# Patient Record
Sex: MALE | Race: WHITE | Employment: OTHER | URBAN - METROPOLITAN AREA
[De-identification: names, ages, dates, MRNs, and addresses within clinical notes are randomized per-mention and may not be internally consistent; named-entity substitution may affect disease eponyms.]

---

## 2017-01-24 PROBLEM — S62.112A: Status: ACTIVE | Noted: 2017-01-24

## 2018-06-08 ENCOUNTER — HOSPITAL ENCOUNTER (OUTPATIENT)
Dept: GENERAL RADIOLOGY | Age: 58
Discharge: HOME | End: 2018-06-10
Payer: MEDICARE

## 2018-06-08 DIAGNOSIS — Z13.820 SCREENING FOR OSTEOPOROSIS: ICD-10-CM

## 2018-06-08 PROCEDURE — 77080 DXA BONE DENSITY AXIAL: CPT

## 2019-03-29 ENCOUNTER — HOSPITAL ENCOUNTER (EMERGENCY)
Age: 59
Discharge: HOME | End: 2019-03-29
Attending: EMERGENCY MEDICINE
Payer: MEDICARE

## 2019-03-29 VITALS
TEMPERATURE: 97.5 F | HEART RATE: 78 BPM | BODY MASS INDEX: 26.52 KG/M2 | OXYGEN SATURATION: 96 % | DIASTOLIC BLOOD PRESSURE: 80 MMHG | SYSTOLIC BLOOD PRESSURE: 144 MMHG | RESPIRATION RATE: 16 BRPM | WEIGHT: 175 LBS | HEIGHT: 68 IN

## 2019-03-29 DIAGNOSIS — K21.00 GASTROESOPHAGEAL REFLUX DISEASE WITH ESOPHAGITIS: Primary | ICD-10-CM

## 2019-03-29 LAB
EKG ATRIAL RATE: 71 BPM
EKG P AXIS: 26 DEGREES
EKG P-R INTERVAL: 132 MS
EKG Q-T INTERVAL: 410 MS
EKG QRS DURATION: 90 MS
EKG QTC CALCULATION (BAZETT): 445 MS
EKG R AXIS: -23 DEGREES
EKG T AXIS: 1 DEGREES
EKG VENTRICULAR RATE: 71 BPM

## 2019-03-29 PROCEDURE — 93005 ELECTROCARDIOGRAM TRACING: CPT | Performed by: EMERGENCY MEDICINE

## 2019-03-29 PROCEDURE — 6370000000 HC RX 637 (ALT 250 FOR IP): Performed by: EMERGENCY MEDICINE

## 2019-03-29 PROCEDURE — 99284 EMERGENCY DEPT VISIT MOD MDM: CPT

## 2019-03-29 RX ORDER — SUCRALFATE 1 G/1
1 TABLET ORAL 4 TIMES DAILY
Qty: 40 TABLET | Refills: 1 | Status: SHIPPED | OUTPATIENT
Start: 2019-03-29

## 2019-03-29 RX ADMIN — LIDOCAINE HYDROCHLORIDE: 20 SOLUTION ORAL; TOPICAL at 08:07

## 2019-03-29 ASSESSMENT — PAIN DESCRIPTION - ORIENTATION: ORIENTATION: LEFT

## 2019-03-29 ASSESSMENT — PAIN DESCRIPTION - DESCRIPTORS: DESCRIPTORS: BURNING;CONSTANT

## 2019-03-29 ASSESSMENT — PAIN SCALES - GENERAL
PAINLEVEL_OUTOF10: 9
PAINLEVEL_OUTOF10: 3

## 2019-03-29 ASSESSMENT — PAIN DESCRIPTION - PAIN TYPE: TYPE: CHRONIC PAIN

## 2019-03-29 ASSESSMENT — PAIN DESCRIPTION - LOCATION: LOCATION: CHEST

## 2019-03-29 NOTE — ED PROVIDER NOTES
Interpretation: Normal      ---------------------------------------------------PHYSICAL EXAM--------------------------------------      Constitutional/General: Alert and oriented x3, well appearing, non toxic in NAD  Head: NC/AT  Eyes: PERRL, EOMI    Neck: Supple, full ROM, no meningeal signs  Pulmonary: Lungs clear to auscultation bilaterally, no wheezes, rales, or rhonchi. Not in respiratory distress  Cardiovascular:  Regular rate and rhythm, no murmurs, gallops, or rubs. 2+ distal pulses  Abdomen: Soft, non tender, non distended,   Extremities: Moves all extremities x 4. Warm and well perfused  Skin: warm and dry without rash  Neurologic: GCS 15,  Psych: Normal Affect      ------------------------------ ED COURSE/MEDICAL DECISION MAKING----------------------  Medications   aluminum & magnesium hydroxide-simethicone (MAALOX) 30 mL, lidocaine viscous (XYLOCAINE) 5 mL (GI COCKTAIL) ( Oral Given 3/29/19 0807)         Medical Decision Making:      EKG: This EKG is signed and interpreted by me. Rate: 71  Rhythm: Sinus  Interpretation: no acute changes  Comparison: stable as compared to patient's most recent EKG of 11-      Time: 8:20a  Re-evaluation. Patients symptoms are improving  Repeat physical examination is significantly improved  Carafate will be admitted to the patient's regimen  Counseling: The emergency provider has spoken with the patient and discussed todays results, in addition to providing specific details for the plan of care and counseling regarding the diagnosis and prognosis. Questions are answered at this time and they are agreeable with the plan.      --------------------------------- IMPRESSION AND DISPOSITION ---------------------------------    IMPRESSION  1.  Gastroesophageal reflux disease with esophagitis        DISPOSITION  Disposition: Discharge to home  Patient condition is stable                  Armida Rodgers MD  03/29/19 6149

## 2019-04-03 ENCOUNTER — HOSPITAL ENCOUNTER (OUTPATIENT)
Age: 59
Discharge: HOME | End: 2019-04-05
Payer: MEDICARE

## 2019-04-03 ENCOUNTER — HOSPITAL ENCOUNTER (OUTPATIENT)
Dept: GENERAL RADIOLOGY | Age: 59
Discharge: HOME | End: 2019-04-05
Payer: MEDICARE

## 2019-04-03 DIAGNOSIS — R07.9 CHEST PAIN, UNSPECIFIED TYPE: ICD-10-CM

## 2019-04-03 PROCEDURE — 71046 X-RAY EXAM CHEST 2 VIEWS: CPT

## 2019-04-23 ENCOUNTER — APPOINTMENT (OUTPATIENT)
Dept: GENERAL RADIOLOGY | Age: 59
End: 2019-04-23
Payer: MEDICARE

## 2019-04-23 ENCOUNTER — HOSPITAL ENCOUNTER (EMERGENCY)
Age: 59
Discharge: HOME | End: 2019-04-23
Attending: EMERGENCY MEDICINE
Payer: MEDICARE

## 2019-04-23 VITALS
BODY MASS INDEX: 28.79 KG/M2 | HEIGHT: 68 IN | SYSTOLIC BLOOD PRESSURE: 178 MMHG | RESPIRATION RATE: 14 BRPM | DIASTOLIC BLOOD PRESSURE: 80 MMHG | TEMPERATURE: 98.4 F | HEART RATE: 70 BPM | WEIGHT: 190 LBS | OXYGEN SATURATION: 97 %

## 2019-04-23 DIAGNOSIS — K21.9 GASTROESOPHAGEAL REFLUX DISEASE WITHOUT ESOPHAGITIS: Primary | ICD-10-CM

## 2019-04-23 DIAGNOSIS — R07.89 CHEST WALL PAIN: ICD-10-CM

## 2019-04-23 LAB
ALBUMIN SERPL-MCNC: 4.2 G/DL (ref 3.5–5.2)
ALP BLD-CCNC: 83 U/L (ref 40–129)
ALT SERPL-CCNC: 27 U/L (ref 0–40)
ANION GAP SERPL CALCULATED.3IONS-SCNC: 12 MMOL/L (ref 7–16)
AST SERPL-CCNC: 33 U/L (ref 0–39)
BASOPHILS ABSOLUTE: 0.04 E9/L (ref 0–0.2)
BASOPHILS RELATIVE PERCENT: 0.5 % (ref 0–2)
BILIRUB SERPL-MCNC: <0.2 MG/DL (ref 0–1.2)
BUN BLDV-MCNC: 15 MG/DL (ref 8–23)
CALCIUM SERPL-MCNC: 9 MG/DL (ref 8.6–10.2)
CHLORIDE BLD-SCNC: 102 MMOL/L (ref 98–107)
CO2: 24 MMOL/L (ref 22–29)
CREAT SERPL-MCNC: 0.8 MG/DL (ref 0.7–1.2)
EOSINOPHILS ABSOLUTE: 0.08 E9/L (ref 0.05–0.5)
EOSINOPHILS RELATIVE PERCENT: 0.9 % (ref 0–6)
GFR AFRICAN AMERICAN: >60
GFR NON-AFRICAN AMERICAN: >60 ML/MIN/1.73
GLUCOSE BLD-MCNC: 104 MG/DL (ref 74–99)
HCT VFR BLD CALC: 40.1 % (ref 37–54)
HEMOGLOBIN: 13.4 G/DL (ref 12.5–16.5)
IMMATURE GRANULOCYTES #: 0.04 E9/L
IMMATURE GRANULOCYTES %: 0.5 % (ref 0–5)
LIPASE: 57 U/L (ref 13–60)
LYMPHOCYTES ABSOLUTE: 2.01 E9/L (ref 1.5–4)
LYMPHOCYTES RELATIVE PERCENT: 22.8 % (ref 20–42)
MCH RBC QN AUTO: 30.2 PG (ref 26–35)
MCHC RBC AUTO-ENTMCNC: 33.4 % (ref 32–34.5)
MCV RBC AUTO: 90.5 FL (ref 80–99.9)
MONOCYTES ABSOLUTE: 1.47 E9/L (ref 0.1–0.95)
MONOCYTES RELATIVE PERCENT: 16.6 % (ref 2–12)
NEUTROPHILS ABSOLUTE: 5.19 E9/L (ref 1.8–7.3)
NEUTROPHILS RELATIVE PERCENT: 58.7 % (ref 43–80)
PDW BLD-RTO: 13.2 FL (ref 11.5–15)
PLATELET # BLD: 240 E9/L (ref 130–450)
PMV BLD AUTO: 10.4 FL (ref 7–12)
POTASSIUM SERPL-SCNC: 5.4 MMOL/L (ref 3.5–5)
RBC # BLD: 4.43 E12/L (ref 3.8–5.8)
SODIUM BLD-SCNC: 138 MMOL/L (ref 132–146)
TOTAL PROTEIN: 7.5 G/DL (ref 6.4–8.3)
TROPONIN: <0.01 NG/ML (ref 0–0.03)
WBC # BLD: 8.8 E9/L (ref 4.5–11.5)

## 2019-04-23 PROCEDURE — 36415 COLL VENOUS BLD VENIPUNCTURE: CPT

## 2019-04-23 PROCEDURE — 74022 RADEX COMPL AQT ABD SERIES: CPT

## 2019-04-23 PROCEDURE — 80053 COMPREHEN METABOLIC PANEL: CPT

## 2019-04-23 PROCEDURE — 99285 EMERGENCY DEPT VISIT HI MDM: CPT

## 2019-04-23 PROCEDURE — 96374 THER/PROPH/DIAG INJ IV PUSH: CPT

## 2019-04-23 PROCEDURE — 85025 COMPLETE CBC W/AUTO DIFF WBC: CPT

## 2019-04-23 PROCEDURE — 93005 ELECTROCARDIOGRAM TRACING: CPT | Performed by: EMERGENCY MEDICINE

## 2019-04-23 PROCEDURE — 6370000000 HC RX 637 (ALT 250 FOR IP): Performed by: EMERGENCY MEDICINE

## 2019-04-23 PROCEDURE — 2580000003 HC RX 258: Performed by: EMERGENCY MEDICINE

## 2019-04-23 PROCEDURE — 84484 ASSAY OF TROPONIN QUANT: CPT

## 2019-04-23 PROCEDURE — 83690 ASSAY OF LIPASE: CPT

## 2019-04-23 PROCEDURE — 2500000003 HC RX 250 WO HCPCS: Performed by: EMERGENCY MEDICINE

## 2019-04-23 RX ORDER — PANTOPRAZOLE SODIUM 40 MG/1
40 TABLET, DELAYED RELEASE ORAL DAILY
Qty: 20 TABLET | Refills: 1 | Status: SHIPPED | OUTPATIENT
Start: 2019-04-23

## 2019-04-23 RX ORDER — 0.9 % SODIUM CHLORIDE 0.9 %
500 INTRAVENOUS SOLUTION INTRAVENOUS ONCE
Status: COMPLETED | OUTPATIENT
Start: 2019-04-23 | End: 2019-04-23

## 2019-04-23 RX ADMIN — SODIUM CHLORIDE 500 ML: 9 INJECTION, SOLUTION INTRAVENOUS at 18:26

## 2019-04-23 RX ADMIN — FAMOTIDINE 20 MG: 10 INJECTION, SOLUTION INTRAVENOUS at 18:26

## 2019-04-23 RX ADMIN — LIDOCAINE HYDROCHLORIDE: 20 SOLUTION ORAL; TOPICAL at 18:26

## 2019-04-23 ASSESSMENT — PAIN DESCRIPTION - ORIENTATION: ORIENTATION: LEFT

## 2019-04-23 ASSESSMENT — PAIN - FUNCTIONAL ASSESSMENT: PAIN_FUNCTIONAL_ASSESSMENT: PREVENTS OR INTERFERES SOME ACTIVE ACTIVITIES AND ADLS

## 2019-04-23 ASSESSMENT — PAIN DESCRIPTION - DESCRIPTORS: DESCRIPTORS: BURNING

## 2019-04-23 ASSESSMENT — HEART SCORE: ECG: 1

## 2019-04-23 ASSESSMENT — PAIN DESCRIPTION - LOCATION: LOCATION: CHEST

## 2019-04-23 ASSESSMENT — PAIN DESCRIPTION - FREQUENCY: FREQUENCY: CONTINUOUS

## 2019-04-23 ASSESSMENT — PAIN DESCRIPTION - PAIN TYPE: TYPE: ACUTE PAIN

## 2019-04-23 ASSESSMENT — PAIN SCALES - GENERAL: PAINLEVEL_OUTOF10: 9

## 2019-04-23 NOTE — ED PROVIDER NOTES
HPI:  4/23/19, Time: 6:18 PM        Mackenzie Andrews is a 59 y.o. male presenting to the ED for \"acid reflux symptoms\"  beginning 2 weeks ago. The complaint has been intermittent, moderate in severity, and worsened by nothing. Patient states pain does occasionally radiate into the left pectoral area. He denies any sharp or stabbing character. No radiation to the neck or jaw or down the left arm. No associated nauseous/vomiting, no diaphoresis, no shows breath associated. He denies any worsening of symptoms with exertion. He states he stopped taking his Protonix approximately 2-3 days ago. He has not had any other symptoms. No relieving factors report no other complaint. Review of Systems:   Pertinent positives and negatives are stated within HPI, all other systems reviewed and are negative.    --------------------------------------------- PAST HISTORY ---------------------------------------------  Past Medical History:  has a past medical history of Epilepsy ([facility] [address] Essential hypertension, GERD (gastroesophageal reflux disease), Hyperlipidemia LDL goal <100, and Seizures ([facility] [address] Past Surgical History:  has a past surgical history that includes knee surgery; ECHO Compl W Dop Color Flow (2/8/2013); and fracture surgery. Social History:  reports that he has never smoked. He has never used smokeless tobacco. He reports that he does not drink alcohol or use drugs. Family History: family history includes Cancer in his maternal grandmother; Diabetes in his father. The patients home medications have been reviewed.     Allergies: Phenobarbital    -------------------------------------------------- RESULTS -------------------------------------------------  All laboratory and radiology results have been personally reviewed by myself   LABS:  Results for orders placed or performed during the hospital encounter of 04/23/19   Troponin   Result Value Ref Range    Troponin <0.01 0.00 - 0.03 ng/mL   CBC Auto Differential   Result Value Ref Range    WBC 8.8 4.5 - 11.5 E9/L    RBC 4.43 3.80 - 5.80 E12/L    Hemoglobin 13.4 12.5 - 16.5 g/dL    Hematocrit 40.1 37.0 - 54.0 %    MCV 90.5 80.0 - 99.9 fL    MCH 30.2 26.0 - 35.0 pg    MCHC 33.4 32.0 - 34.5 %    RDW 13.2 11.5 - 15.0 fL    Platelets 667 203 - 330 E9/L    MPV 10.4 7.0 - 12.0 fL    Neutrophils % 58.7 43.0 - 80.0 %    Immature Granulocytes % 0.5 0.0 - 5.0 %    Lymphocytes % 22.8 20.0 - 42.0 %    Monocytes % 16.6 (H) 2.0 - 12.0 %    Eosinophils % 0.9 0.0 - 6.0 %    Basophils % 0.5 0.0 - 2.0 %    Neutrophils # 5.19 1.80 - 7.30 E9/L    Immature Granulocytes # 0.04 E9/L    Lymphocytes # 2.01 1.50 - 4.00 E9/L    Monocytes # 1.47 (H) 0.10 - 0.95 E9/L    Eosinophils # 0.08 0.05 - 0.50 E9/L    Basophils # 0.04 0.00 - 0.20 E9/L   Comprehensive Metabolic Panel   Result Value Ref Range    Sodium 138 132 - 146 mmol/L    Potassium 5.4 (H) 3.5 - 5.0 mmol/L    Chloride 102 98 - 107 mmol/L    CO2 24 22 - 29 mmol/L    Anion Gap 12 7 - 16 mmol/L    Glucose 104 (H) 74 - 99 mg/dL    BUN 15 8 - 23 mg/dL    CREATININE 0.8 0.7 - 1.2 mg/dL    GFR Non-African American >60 >=60 mL/min/1.73    GFR African American >60     Calcium 9.0 8.6 - 10.2 mg/dL    Total Protein 7.5 6.4 - 8.3 g/dL    Alb 4.2 3.5 - 5.2 g/dL    Total Bilirubin <0.2 0.0 - 1.2 mg/dL    Alkaline Phosphatase 83 40 - 129 U/L    ALT 27 0 - 40 U/L    AST 33 0 - 39 U/L   Lipase   Result Value Ref Range    Lipase 57 13 - 60 U/L       RADIOLOGY:  Interpreted by Radiologist.  XR Acute Abd Series Chest 1 VW   Final Result   Nonobstructive bowel gas pattern.                ------------------------- NURSING NOTES AND VITALS REVIEWED ---------------------------  The nursing notes within the ED encounter and vital signs as below have been reviewed.    BP (!) 178/80   Pulse 70   Temp 98.4 °F (36.9 °C) (Oral)   Resp 14   Ht 5' 8\" (1.727 m)   Wt 190 lb (86.2 kg)   SpO2 97%   BMI 28.89 kg/m²   Oxygen Saturation Interpretation: Normal      ---------------------------------------------------PHYSICAL EXAM--------------------------------------      Constitutional/General: Alert and oriented x3, well appearing, non toxic in mild distress  Head: Normocephalic and atraumatic  Eyes: PERRL, EOMI, otherwise normal  Mouth: Oropharynx clear, handling secretions, no trismus  Neck: Supple, full ROM, trachea midline. No JVD  Pulmonary: Lungs clear to auscultation bilaterally, no wheezes, rales, or rhonchi. Not in respiratory distress  Cardiovascular:  Regular rate and rhythm, no murmurs, gallops, or rubs. 2+ distal pulses;  PMI nondisplaced  Abdomen: Soft, non tender, non distended, no organomegaly no masses no guarding or rigidity. Normal bowel sounds  Extremities: Moves all extremities x 4. Warm and well perfused  Skin: warm and dry without rash  Neurologic: GCS 15, cranial nerves II through XII are grossly intact no focal deficits. Psych: Normal Affect    ------------------------------ ED COURSE/MEDICAL DECISION MAKING----------------------  Medications   0.9 % sodium chloride bolus (500 mLs Intravenous New Bag 4/23/19 1826)   famotidine (PEPCID) injection 20 mg (20 mg Intravenous Given 4/23/19 1826)   aluminum & magnesium hydroxide-simethicone (MAALOX) 30 mL, lidocaine viscous (XYLOCAINE) 5 mL (GI COCKTAIL) ( Oral Given 4/23/19 1826)     ED COURSE:     Medical Decision Making:   Differential Diagnoses:  GERD, MI, PE, Pneumonia, Pneumothorax, Chest Wall Strain, Pericarditis, to name a few. Pt's HEART Score = 4 points, Moderate Risk Score;  for this reason a troponin was obtained even though his symptoms sounded more like GERD. Patient has had the symptoms for 2 weeks and has a normal troponin so this is not felt to be unlikely anginal equivalent discomfort.   Pt's Well's Score for PE = 0 points, Low Risk Score  Pt's CXR does not show any focal infiltrates nor any evidence of pneumothorax nor any mediastinal abnormalities to suggest aortic

## 2019-04-26 LAB
EKG ATRIAL RATE: 77 BPM
EKG P AXIS: 38 DEGREES
EKG P-R INTERVAL: 140 MS
EKG Q-T INTERVAL: 384 MS
EKG QRS DURATION: 86 MS
EKG QTC CALCULATION (BAZETT): 434 MS
EKG R AXIS: -22 DEGREES
EKG T AXIS: 21 DEGREES
EKG VENTRICULAR RATE: 77 BPM

## 2019-06-26 ENCOUNTER — HOSPITAL ENCOUNTER (OUTPATIENT)
Dept: GENERAL RADIOLOGY | Age: 59
Discharge: HOME | End: 2019-06-28
Payer: MEDICARE

## 2019-06-26 ENCOUNTER — HOSPITAL ENCOUNTER (OUTPATIENT)
Age: 59
Discharge: HOME | End: 2019-06-28
Payer: MEDICARE

## 2019-06-26 DIAGNOSIS — R07.9 CHEST PAIN, UNSPECIFIED TYPE: ICD-10-CM

## 2019-06-26 PROCEDURE — 71046 X-RAY EXAM CHEST 2 VIEWS: CPT

## 2019-07-26 ENCOUNTER — APPOINTMENT (OUTPATIENT)
Dept: GENERAL RADIOLOGY | Age: 59
End: 2019-07-26
Payer: MEDICARE

## 2019-07-26 ENCOUNTER — HOSPITAL ENCOUNTER (EMERGENCY)
Age: 59
Discharge: HOME | End: 2019-07-26
Attending: EMERGENCY MEDICINE
Payer: MEDICARE

## 2019-07-26 VITALS
SYSTOLIC BLOOD PRESSURE: 132 MMHG | OXYGEN SATURATION: 97 % | TEMPERATURE: 98.8 F | HEIGHT: 68 IN | BODY MASS INDEX: 26.83 KG/M2 | RESPIRATION RATE: 16 BRPM | WEIGHT: 177 LBS | HEART RATE: 90 BPM | DIASTOLIC BLOOD PRESSURE: 84 MMHG

## 2019-07-26 DIAGNOSIS — K21.9 GASTROESOPHAGEAL REFLUX DISEASE WITHOUT ESOPHAGITIS: Primary | ICD-10-CM

## 2019-07-26 LAB
EKG ATRIAL RATE: 87 BPM
EKG P AXIS: 41 DEGREES
EKG P-R INTERVAL: 146 MS
EKG Q-T INTERVAL: 376 MS
EKG QRS DURATION: 92 MS
EKG QTC CALCULATION (BAZETT): 452 MS
EKG R AXIS: -21 DEGREES
EKG T AXIS: 44 DEGREES
EKG VENTRICULAR RATE: 87 BPM
TROPONIN: <0.01 NG/ML (ref 0–0.03)

## 2019-07-26 PROCEDURE — 71045 X-RAY EXAM CHEST 1 VIEW: CPT

## 2019-07-26 PROCEDURE — 93005 ELECTROCARDIOGRAM TRACING: CPT | Performed by: EMERGENCY MEDICINE

## 2019-07-26 PROCEDURE — 84484 ASSAY OF TROPONIN QUANT: CPT

## 2019-07-26 PROCEDURE — 36415 COLL VENOUS BLD VENIPUNCTURE: CPT

## 2019-07-26 PROCEDURE — 6370000000 HC RX 637 (ALT 250 FOR IP): Performed by: EMERGENCY MEDICINE

## 2019-07-26 PROCEDURE — 93010 ELECTROCARDIOGRAM REPORT: CPT | Performed by: INTERNAL MEDICINE

## 2019-07-26 PROCEDURE — 99284 EMERGENCY DEPT VISIT MOD MDM: CPT

## 2019-07-26 RX ORDER — FAMOTIDINE 20 MG/1
20 TABLET, FILM COATED ORAL 2 TIMES DAILY
Qty: 14 TABLET | Refills: 0 | Status: ON HOLD | OUTPATIENT
Start: 2019-07-26 | End: 2020-01-03 | Stop reason: HOSPADM

## 2019-07-26 RX ADMIN — LIDOCAINE HYDROCHLORIDE: 20 SOLUTION ORAL; TOPICAL at 02:19

## 2019-07-26 ASSESSMENT — PAIN DESCRIPTION - LOCATION: LOCATION: CHEST

## 2019-07-26 ASSESSMENT — PAIN DESCRIPTION - DESCRIPTORS: DESCRIPTORS: BURNING

## 2019-07-26 ASSESSMENT — HEART SCORE: ECG: 1

## 2019-07-26 ASSESSMENT — PAIN SCALES - GENERAL: PAINLEVEL_OUTOF10: 9

## 2019-07-26 ASSESSMENT — PAIN DESCRIPTION - ORIENTATION: ORIENTATION: LEFT

## 2019-07-26 ASSESSMENT — PAIN DESCRIPTION - PAIN TYPE: TYPE: ACUTE PAIN

## 2019-07-26 NOTE — ED NOTES
Left chest hurts and into axilla. Worsens at night and sometimes vomits. Pt does sleep with HOB elevated. Pt ate pizza last tez.      Ed Nicholson RN  07/26/19 3567

## 2019-07-30 ENCOUNTER — TELEPHONE (OUTPATIENT)
Dept: CARDIOLOGY | Age: 59
End: 2019-07-30

## 2019-08-05 ENCOUNTER — HOSPITAL ENCOUNTER (OUTPATIENT)
Age: 59
Discharge: HOME | End: 2019-08-07

## 2019-08-05 ENCOUNTER — TELEPHONE (OUTPATIENT)
Dept: CARDIOLOGY | Age: 59
End: 2019-08-05

## 2019-08-05 LAB
ABO/RH: NORMAL
ANION GAP SERPL CALCULATED.3IONS-SCNC: 14 MMOL/L (ref 7–16)
ANTIBODY SCREEN: NORMAL
BUN BLDV-MCNC: 26 MG/DL (ref 8–23)
CALCIUM SERPL-MCNC: 9.8 MG/DL (ref 8.6–10.2)
CHLORIDE BLD-SCNC: 93 MMOL/L (ref 98–107)
CO2: 29 MMOL/L (ref 22–29)
CREAT SERPL-MCNC: 1 MG/DL (ref 0.7–1.2)
GFR AFRICAN AMERICAN: >60
GFR NON-AFRICAN AMERICAN: >60 ML/MIN/1.73
GLUCOSE BLD-MCNC: 156 MG/DL (ref 74–99)
HCT VFR BLD CALC: 45.3 % (ref 37–54)
HEMOGLOBIN: 15.2 G/DL (ref 12.5–16.5)
MCH RBC QN AUTO: 30.5 PG (ref 26–35)
MCHC RBC AUTO-ENTMCNC: 33.6 % (ref 32–34.5)
MCV RBC AUTO: 91 FL (ref 80–99.9)
PDW BLD-RTO: 13.8 FL (ref 11.5–15)
PLATELET # BLD: 255 E9/L (ref 130–450)
PMV BLD AUTO: 11 FL (ref 7–12)
POTASSIUM SERPL-SCNC: 3.7 MMOL/L (ref 3.5–5)
RBC # BLD: 4.98 E12/L (ref 3.8–5.8)
SODIUM BLD-SCNC: 136 MMOL/L (ref 132–146)
WBC # BLD: 7.4 E9/L (ref 4.5–11.5)

## 2019-08-05 PROCEDURE — 87081 CULTURE SCREEN ONLY: CPT

## 2019-08-05 PROCEDURE — 86900 BLOOD TYPING SEROLOGIC ABO: CPT

## 2019-08-05 PROCEDURE — 80048 BASIC METABOLIC PNL TOTAL CA: CPT

## 2019-08-05 PROCEDURE — 85027 COMPLETE CBC AUTOMATED: CPT

## 2019-08-05 PROCEDURE — 86850 RBC ANTIBODY SCREEN: CPT

## 2019-08-05 PROCEDURE — 86901 BLOOD TYPING SEROLOGIC RH(D): CPT

## 2019-08-07 ENCOUNTER — HOSPITAL ENCOUNTER (OUTPATIENT)
Dept: CARDIOLOGY | Age: 59
Discharge: HOME | End: 2019-08-07
Payer: MEDICARE

## 2019-08-07 VITALS
HEART RATE: 86 BPM | SYSTOLIC BLOOD PRESSURE: 150 MMHG | HEIGHT: 68 IN | BODY MASS INDEX: 26.83 KG/M2 | DIASTOLIC BLOOD PRESSURE: 70 MMHG | WEIGHT: 177 LBS

## 2019-08-07 DIAGNOSIS — I20.8 ANGINAL EQUIVALENT (HCC): Primary | ICD-10-CM

## 2019-08-07 LAB
LV EF: 60 %
LVEF MODALITY: NORMAL
MRSA CULTURE ONLY: NORMAL

## 2019-08-07 PROCEDURE — 2580000003 HC RX 258: Performed by: INTERNAL MEDICINE

## 2019-08-07 PROCEDURE — 3430000000 HC RX DIAGNOSTIC RADIOPHARMACEUTICAL: Performed by: INTERNAL MEDICINE

## 2019-08-07 PROCEDURE — A9500 TC99M SESTAMIBI: HCPCS | Performed by: INTERNAL MEDICINE

## 2019-08-07 PROCEDURE — 93017 CV STRESS TEST TRACING ONLY: CPT

## 2019-08-07 PROCEDURE — 78452 HT MUSCLE IMAGE SPECT MULT: CPT

## 2019-08-07 RX ORDER — SODIUM CHLORIDE 0.9 % (FLUSH) 0.9 %
10 SYRINGE (ML) INJECTION PRN
Status: DISCONTINUED | OUTPATIENT
Start: 2019-08-07 | End: 2019-08-08 | Stop reason: HOSPADM

## 2019-08-07 RX ADMIN — Medication 10.2 MILLICURIE: at 08:22

## 2019-08-07 RX ADMIN — Medication 10 ML: at 08:22

## 2019-08-07 RX ADMIN — Medication 10 ML: at 10:44

## 2019-08-07 RX ADMIN — Medication 30.2 MILLICURIE: at 10:44

## 2019-08-17 ENCOUNTER — HOSPITAL ENCOUNTER (OUTPATIENT)
Age: 59
Discharge: HOME | End: 2019-08-19

## 2019-08-17 LAB
ALBUMIN SERPL-MCNC: 3.4 G/DL (ref 3.5–5.2)
ALP BLD-CCNC: 70 U/L (ref 40–129)
ALT SERPL-CCNC: 69 U/L (ref 0–40)
ANION GAP SERPL CALCULATED.3IONS-SCNC: 7 MMOL/L (ref 7–16)
AST SERPL-CCNC: 59 U/L (ref 0–39)
BASOPHILS ABSOLUTE: 0.03 E9/L (ref 0–0.2)
BASOPHILS RELATIVE PERCENT: 0.3 % (ref 0–2)
BILIRUB SERPL-MCNC: <0.2 MG/DL (ref 0–1.2)
BUN BLDV-MCNC: 9 MG/DL (ref 8–23)
CALCIUM SERPL-MCNC: 8.6 MG/DL (ref 8.6–10.2)
CHLORIDE BLD-SCNC: 102 MMOL/L (ref 98–107)
CO2: 28 MMOL/L (ref 22–29)
CREAT SERPL-MCNC: 0.7 MG/DL (ref 0.7–1.2)
EOSINOPHILS ABSOLUTE: 0.02 E9/L (ref 0.05–0.5)
EOSINOPHILS RELATIVE PERCENT: 0.2 % (ref 0–6)
GFR AFRICAN AMERICAN: >60
GFR NON-AFRICAN AMERICAN: >60 ML/MIN/1.73
GLUCOSE BLD-MCNC: 116 MG/DL (ref 74–99)
HCT VFR BLD CALC: 37 % (ref 37–54)
HEMOGLOBIN: 11.8 G/DL (ref 12.5–16.5)
IMMATURE GRANULOCYTES #: 0.03 E9/L
IMMATURE GRANULOCYTES %: 0.3 % (ref 0–5)
LYMPHOCYTES ABSOLUTE: 2.08 E9/L (ref 1.5–4)
LYMPHOCYTES RELATIVE PERCENT: 19.4 % (ref 20–42)
MCH RBC QN AUTO: 30.3 PG (ref 26–35)
MCHC RBC AUTO-ENTMCNC: 31.9 % (ref 32–34.5)
MCV RBC AUTO: 94.9 FL (ref 80–99.9)
MONOCYTES ABSOLUTE: 1.24 E9/L (ref 0.1–0.95)
MONOCYTES RELATIVE PERCENT: 11.6 % (ref 2–12)
NEUTROPHILS ABSOLUTE: 7.33 E9/L (ref 1.8–7.3)
NEUTROPHILS RELATIVE PERCENT: 68.2 % (ref 43–80)
PDW BLD-RTO: 13.9 FL (ref 11.5–15)
PLATELET # BLD: 211 E9/L (ref 130–450)
PMV BLD AUTO: 10.9 FL (ref 7–12)
POTASSIUM SERPL-SCNC: 4.5 MMOL/L (ref 3.5–5)
RBC # BLD: 3.9 E12/L (ref 3.8–5.8)
SODIUM BLD-SCNC: 137 MMOL/L (ref 132–146)
TOTAL PROTEIN: 6.6 G/DL (ref 6.4–8.3)
WBC # BLD: 10.7 E9/L (ref 4.5–11.5)

## 2019-08-17 PROCEDURE — 80053 COMPREHEN METABOLIC PANEL: CPT

## 2019-08-17 PROCEDURE — 85025 COMPLETE CBC W/AUTO DIFF WBC: CPT

## 2019-12-30 ENCOUNTER — HOSPITAL ENCOUNTER (INPATIENT)
Age: 59
LOS: 4 days | Discharge: HOME | DRG: 885 | End: 2020-01-03
Attending: EMERGENCY MEDICINE | Admitting: PSYCHIATRY & NEUROLOGY
Payer: MEDICARE

## 2019-12-30 PROBLEM — F32.A ACUTE DEPRESSION: Status: ACTIVE | Noted: 2019-12-30

## 2019-12-30 LAB
ACETAMINOPHEN LEVEL: <5 MCG/ML (ref 10–30)
ALBUMIN SERPL-MCNC: 4.2 G/DL (ref 3.5–5.2)
ALP BLD-CCNC: 87 U/L (ref 40–129)
ALT SERPL-CCNC: 18 U/L (ref 0–40)
AMPHETAMINE SCREEN, URINE: NOT DETECTED
ANION GAP SERPL CALCULATED.3IONS-SCNC: 11 MMOL/L (ref 7–16)
AST SERPL-CCNC: 15 U/L (ref 0–39)
BARBITURATE SCREEN URINE: NOT DETECTED
BASOPHILS ABSOLUTE: 0.04 E9/L (ref 0–0.2)
BASOPHILS RELATIVE PERCENT: 0.5 % (ref 0–2)
BENZODIAZEPINE SCREEN, URINE: NOT DETECTED
BILIRUB SERPL-MCNC: <0.2 MG/DL (ref 0–1.2)
BILIRUBIN URINE: NEGATIVE
BLOOD, URINE: NEGATIVE
BUN BLDV-MCNC: 19 MG/DL (ref 8–23)
CALCIUM SERPL-MCNC: 9.7 MG/DL (ref 8.6–10.2)
CANNABINOID SCREEN URINE: NOT DETECTED
CHLORIDE BLD-SCNC: 102 MMOL/L (ref 98–107)
CLARITY: CLEAR
CO2: 26 MMOL/L (ref 22–29)
COCAINE METABOLITE SCREEN URINE: NOT DETECTED
COLOR: YELLOW
CREAT SERPL-MCNC: 0.7 MG/DL (ref 0.7–1.2)
EOSINOPHILS ABSOLUTE: 0.08 E9/L (ref 0.05–0.5)
EOSINOPHILS RELATIVE PERCENT: 1 % (ref 0–6)
ETHANOL: <10 MG/DL (ref 0–0.08)
FENTANYL SCREEN, URINE: NOT DETECTED
GFR AFRICAN AMERICAN: >60
GFR NON-AFRICAN AMERICAN: >60 ML/MIN/1.73
GLUCOSE BLD-MCNC: 99 MG/DL (ref 74–99)
GLUCOSE URINE: NEGATIVE MG/DL
HCT VFR BLD CALC: 43.4 % (ref 37–54)
HEMOGLOBIN: 14.4 G/DL (ref 12.5–16.5)
IMMATURE GRANULOCYTES #: 0.03 E9/L
IMMATURE GRANULOCYTES %: 0.4 % (ref 0–5)
KETONES, URINE: NEGATIVE MG/DL
LEUKOCYTE ESTERASE, URINE: NEGATIVE
LYMPHOCYTES ABSOLUTE: 1.66 E9/L (ref 1.5–4)
LYMPHOCYTES RELATIVE PERCENT: 20.4 % (ref 20–42)
Lab: NORMAL
MCH RBC QN AUTO: 30.3 PG (ref 26–35)
MCHC RBC AUTO-ENTMCNC: 33.2 % (ref 32–34.5)
MCV RBC AUTO: 91.2 FL (ref 80–99.9)
METHADONE SCREEN, URINE: NOT DETECTED
MONOCYTES ABSOLUTE: 1.15 E9/L (ref 0.1–0.95)
MONOCYTES RELATIVE PERCENT: 14.1 % (ref 2–12)
NEUTROPHILS ABSOLUTE: 5.19 E9/L (ref 1.8–7.3)
NEUTROPHILS RELATIVE PERCENT: 63.6 % (ref 43–80)
NITRITE, URINE: NEGATIVE
OPIATE SCREEN URINE: NOT DETECTED
OXYCODONE URINE: NOT DETECTED
PDW BLD-RTO: 13.1 FL (ref 11.5–15)
PH UA: 6 (ref 5–9)
PHENCYCLIDINE SCREEN URINE: NOT DETECTED
PLATELET # BLD: 223 E9/L (ref 130–450)
PMV BLD AUTO: 10.5 FL (ref 7–12)
POTASSIUM SERPL-SCNC: 4.2 MMOL/L (ref 3.5–5)
PROTEIN UA: NEGATIVE MG/DL
RBC # BLD: 4.76 E12/L (ref 3.8–5.8)
SALICYLATE, SERUM: <0.3 MG/DL (ref 0–30)
SODIUM BLD-SCNC: 139 MMOL/L (ref 132–146)
SPECIFIC GRAVITY UA: 1.02 (ref 1–1.03)
TOTAL PROTEIN: 8.3 G/DL (ref 6.4–8.3)
TRICYCLIC ANTIDEPRESSANTS SCREEN SERUM: NEGATIVE NG/ML
TROPONIN: <0.01 NG/ML (ref 0–0.03)
UROBILINOGEN, URINE: 0.2 E.U./DL
WBC # BLD: 8.2 E9/L (ref 4.5–11.5)

## 2019-12-30 PROCEDURE — 36415 COLL VENOUS BLD VENIPUNCTURE: CPT

## 2019-12-30 PROCEDURE — 80307 DRUG TEST PRSMV CHEM ANLYZR: CPT

## 2019-12-30 PROCEDURE — 1240000000 HC EMOTIONAL WELLNESS R&B

## 2019-12-30 PROCEDURE — 85025 COMPLETE CBC W/AUTO DIFF WBC: CPT

## 2019-12-30 PROCEDURE — 93005 ELECTROCARDIOGRAM TRACING: CPT | Performed by: EMERGENCY MEDICINE

## 2019-12-30 PROCEDURE — 6370000000 HC RX 637 (ALT 250 FOR IP): Performed by: EMERGENCY MEDICINE

## 2019-12-30 PROCEDURE — 6370000000 HC RX 637 (ALT 250 FOR IP): Performed by: PSYCHIATRY & NEUROLOGY

## 2019-12-30 PROCEDURE — 80053 COMPREHEN METABOLIC PANEL: CPT

## 2019-12-30 PROCEDURE — 84484 ASSAY OF TROPONIN QUANT: CPT

## 2019-12-30 PROCEDURE — 81003 URINALYSIS AUTO W/O SCOPE: CPT

## 2019-12-30 PROCEDURE — 99291 CRITICAL CARE FIRST HOUR: CPT

## 2019-12-30 PROCEDURE — G0480 DRUG TEST DEF 1-7 CLASSES: HCPCS

## 2019-12-30 RX ORDER — PHENYTOIN SODIUM 100 MG/1
100 CAPSULE, EXTENDED RELEASE ORAL NIGHTLY
Status: DISCONTINUED | OUTPATIENT
Start: 2019-12-30 | End: 2019-12-30 | Stop reason: SDUPTHER

## 2019-12-30 RX ORDER — ACETAMINOPHEN 325 MG/1
650 TABLET ORAL EVERY 4 HOURS PRN
Status: DISCONTINUED | OUTPATIENT
Start: 2019-12-30 | End: 2020-01-03 | Stop reason: HOSPADM

## 2019-12-30 RX ORDER — OLANZAPINE 10 MG/1
5 INJECTION, POWDER, LYOPHILIZED, FOR SOLUTION INTRAMUSCULAR EVERY 4 HOURS PRN
Status: DISCONTINUED | OUTPATIENT
Start: 2019-12-30 | End: 2020-01-03 | Stop reason: HOSPADM

## 2019-12-30 RX ORDER — HYDROXYZINE PAMOATE 25 MG/1
50 CAPSULE ORAL 3 TIMES DAILY PRN
Status: DISCONTINUED | OUTPATIENT
Start: 2019-12-30 | End: 2020-01-03 | Stop reason: HOSPADM

## 2019-12-30 RX ORDER — LEVETIRACETAM 500 MG/1
1000 TABLET ORAL 2 TIMES DAILY
Status: DISCONTINUED | OUTPATIENT
Start: 2019-12-30 | End: 2019-12-31 | Stop reason: SDUPTHER

## 2019-12-30 RX ORDER — OLANZAPINE 2.5 MG/1
2.5 TABLET ORAL EVERY 4 HOURS PRN
Status: DISCONTINUED | OUTPATIENT
Start: 2019-12-30 | End: 2020-01-03 | Stop reason: HOSPADM

## 2019-12-30 RX ORDER — BENZTROPINE MESYLATE 1 MG/ML
2 INJECTION INTRAMUSCULAR; INTRAVENOUS 2 TIMES DAILY PRN
Status: DISCONTINUED | OUTPATIENT
Start: 2019-12-30 | End: 2020-01-03 | Stop reason: HOSPADM

## 2019-12-30 RX ORDER — PHENYTOIN SODIUM 100 MG/1
200 CAPSULE, EXTENDED RELEASE ORAL NIGHTLY
Status: DISCONTINUED | OUTPATIENT
Start: 2019-12-30 | End: 2020-01-03 | Stop reason: HOSPADM

## 2019-12-30 RX ORDER — MAGNESIUM HYDROXIDE/ALUMINUM HYDROXICE/SIMETHICONE 120; 1200; 1200 MG/30ML; MG/30ML; MG/30ML
30 SUSPENSION ORAL PRN
Status: DISCONTINUED | OUTPATIENT
Start: 2019-12-30 | End: 2020-01-03 | Stop reason: HOSPADM

## 2019-12-30 RX ORDER — LEVETIRACETAM 10 MG/ML
1000 INJECTION INTRAVASCULAR ONCE
Status: DISCONTINUED | OUTPATIENT
Start: 2019-12-30 | End: 2019-12-30

## 2019-12-30 RX ORDER — PHENYTOIN SODIUM 100 MG/1
30 CAPSULE, EXTENDED RELEASE ORAL ONCE
Status: DISCONTINUED | OUTPATIENT
Start: 2019-12-30 | End: 2019-12-30

## 2019-12-30 RX ADMIN — PHENYTOIN SODIUM 200 MG: 100 CAPSULE, EXTENDED RELEASE ORAL at 22:49

## 2019-12-30 RX ADMIN — LEVETIRACETAM 1000 MG: 500 TABLET ORAL at 19:43

## 2019-12-30 ASSESSMENT — PAIN SCALES - GENERAL: PAINLEVEL_OUTOF10: 0

## 2019-12-30 ASSESSMENT — PAIN - FUNCTIONAL ASSESSMENT
PAIN_FUNCTIONAL_ASSESSMENT: ACTIVITIES ARE NOT PREVENTED
PAIN_FUNCTIONAL_ASSESSMENT: 0-10

## 2019-12-30 ASSESSMENT — PAIN DESCRIPTION - DESCRIPTORS: DESCRIPTORS: BURNING

## 2019-12-30 ASSESSMENT — LIFESTYLE VARIABLES: HISTORY_ALCOHOL_USE: NO

## 2019-12-30 ASSESSMENT — SLEEP AND FATIGUE QUESTIONNAIRES
DO YOU USE A SLEEP AID: NO
AVERAGE NUMBER OF SLEEP HOURS: 8
DO YOU HAVE DIFFICULTY SLEEPING: NO

## 2019-12-30 ASSESSMENT — PATIENT HEALTH QUESTIONNAIRE - PHQ9: SUM OF ALL RESPONSES TO PHQ QUESTIONS 1-9: 3

## 2019-12-30 NOTE — ED NOTES
THE PT IS A 59 YR OLD WHITE MALE WHO WAS PINK SLIPPED BY POLICE AFTER THEY WERE CALLED TO THE HOUSE BY THE PT'S BROTHER. THE PINK SLIP STATED THAT THE PT SAID HE WAS GOING TO COMMIT SUICIDE ON DEC 31ST B/ THAT WAS THE ANNIVERSARY OF THE PT'S MOTHER'S DEATH. THE PT DENIES THIS AND STATES THAT HIS MOM  AUG 2ND 2017. THE PT STATED THAT HE SAID THINGS TO HIS BROTHER THAT MADE HIM THINK HE WAS SUICIDAL BUT HE DENIES THAT HE IS. THE PT ADMITTED THAT HE FELT SUICIDAL AROUND  BUT DENIES IT CURRENTLY. HE STATED THAT HE HAS BEEN DEPRESSED SINCE HIS MOM . HE STATED THAT HE LIVED WITH HIS MOM AND NEVER  OR HAD CHILDREN. HE STATED THAT HE USED TO WORK AT [employer] BUT IS NOW ON DISABILITY. HE STATED THAT HE STRUGGLES WITH EVERYDAY BILLS. HE STATED THAT HE WAS SEEING A COUNSELOR FOR 3 YRS AT [facility] BUT COULD NOT SEE HER ANYMORE WHEN HE GOT MEDICARE AND HE DID NOT WISH TO SWITCH COUNSELORS. HE STATED THAT HE HAS NEVER BEEN ON PSYCH MEDS AND HAS NEVER BEEN IN INPT PSYCH. HE DENIES PAST OR PRESENT HI, AVH, AND AOD USE. HE DENIES A HX OF SUICIDE ATTEMPTS. HE DID ADMIT THAT HE WAS FEELING VERY DEPRESSED ON DEC 26TH AND HE DROVE TO [place] WHICH IS WHAT HE DOES WHEN HE GETS DOWN. HE STATED THAT IT HELPED HIM. THE PT WAS CALM AND COOPERATIVE AND GAVE A GOOD HX EXCEPT WITH REGARD TO WHAT HE SAID TO HIS FAMILY TO MAKE THEM FEEL THAT HE WAS A RISK TO HIMSELF. THE PT WAS ACCEPTED BY DR Eve Ortega TO [facility].        Darcie Crockett, [facility]  19

## 2019-12-30 NOTE — ED PROVIDER NOTES
HPI:  12/30/19, Time: 3:06 PM         Delia Pang is a 62 y.o. male presenting to the ED for SI with no plan , beginning several hours ago. The complaint has been persistent, moderate in severity, and worsened by nothing. Patient reports ongoing depression since his wife passed away the same time last year. He told EMS he is suicidal has no plan. ROS:   Pertinent positives and negatives are stated within HPI, all other systems reviewed and are negative.  --------------------------------------------- PAST HISTORY ---------------------------------------------  Past Medical History:  has a past medical history of Epilepsy ([facility] [address] Essential hypertension, GERD (gastroesophageal reflux disease), Hyperlipidemia LDL goal <100, and Seizures ([facility] [address] Past Surgical History:  has a past surgical history that includes knee surgery; ECHO Compl W Dop Color Flow (2/8/2013); and fracture surgery. Social History:  reports that he has never smoked. He has never used smokeless tobacco. He reports that he does not drink alcohol or use drugs. Family History: family history includes Cancer in his maternal grandmother; Diabetes in his father. The patients home medications have been reviewed. Allergies: Phenobarbital    ---------------------------------------------------PHYSICAL EXAM--------------------------------------    Constitutional/General: Alert and oriented x3, well appearing, non toxic in NAD  Head: Normocephalic and atraumatic  Eyes: PERRL, EOMI  Mouth: Oropharynx clear, handling secretions, no trismus  Neck: Supple, full ROM, non tender to palpation in the midline, no stridor, no crepitus, no meningeal signs  Pulmonary: Lungs clear to auscultation bilaterally, no wheezes, rales, or rhonchi. Not in respiratory distress  Cardiovascular:  Regular rate. Regular rhythm. No murmurs, gallops, or rubs. 2+ distal pulses  Chest: no chest wall tenderness  Abdomen: Soft. Non tender. Non distended. +BS.   No Total Protein 8.3 6.4 - 8.3 g/dL    Alb 4.2 3.5 - 5.2 g/dL    Total Bilirubin <0.2 0.0 - 1.2 mg/dL    Alkaline Phosphatase 87 40 - 129 U/L    ALT 18 0 - 40 U/L    AST 15 0 - 39 U/L   Troponin   Result Value Ref Range    Troponin <0.01 0.00 - 0.03 ng/mL   Urinalysis   Result Value Ref Range    Color, UA Yellow Straw/Yellow    Clarity, UA Clear Clear    Glucose, Ur Negative Negative mg/dL    Bilirubin Urine Negative Negative    Ketones, Urine Negative Negative mg/dL    Specific Gravity, UA 1.020 1.005 - 1.030    Blood, Urine Negative Negative    pH, UA 6.0 5.0 - 9.0    Protein, UA Negative Negative mg/dL    Urobilinogen, Urine 0.2 <2.0 E.U./dL    Nitrite, Urine Negative Negative    Leukocyte Esterase, Urine Negative Negative   Urine Drug Screen   Result Value Ref Range    Amphetamine Screen, Urine NOT DETECTED Negative <1000 ng/mL    Barbiturate Screen, Ur NOT DETECTED Negative < 200 ng/mL    Benzodiazepine Screen, Urine NOT DETECTED Negative < 200 ng/mL    Cannabinoid Scrn, Ur NOT DETECTED Negative < 50ng/mL    Cocaine Metabolite Screen, Urine NOT DETECTED Negative < 300 ng/mL    Opiate Scrn, Ur NOT DETECTED Negative < 300ng/mL    PCP Screen, Urine NOT DETECTED Negative < 25 ng/mL    Methadone Screen, Urine NOT DETECTED Negative <300 ng/mL    Oxycodone Urine NOT DETECTED Negative <100 ng/mL    FENTANYL SCREEN, URINE NOT DETECTED Negative <1 ng/mL    Drug Screen Comment: see below    Serum Drug Screen   Result Value Ref Range    Ethanol Lvl <10 mg/dL    Acetaminophen Level <5.0 (L) 10.0 - 46.5 mcg/mL    Salicylate, Serum <9.0 0.0 - 30.0 mg/dL    TCA Scrn NEGATIVE Cutoff:300 ng/mL   Lithium Level   Result Value Ref Range    Lithium Dose Amount Unknown        RADIOLOGY:  Interpreted by Radiologist.  No orders to display         EKG Interpretation  Interpreted by emergency department physician    Rhythm: normal sinus   Rate: normal  Axis: normal  Conduction: normal  ST Segments: no acute change  T Waves: no acute withdrawal or failure to initiate urgent interventions for this patient would likely result in a life threatening deterioration or permanent disability. Accordingly this patient received the above mentioned time, excluding separately billable procedures. This patient's ED course included: a personal history and physicial examination, re-evaluation prior to disposition, complex medical decision making and emergency management and a personal history and physicial eaxmination    This patient has remained hemodynamically stable, remained unchanged and been closely monitored during their ED course. Counseling: The emergency provider has spoken with the patient and discussed todays results, in addition to providing specific details for the plan of care and counseling regarding the diagnosis and prognosis. Questions are answered at this time and they are agreeable with the plan.       --------------------------------- IMPRESSION AND DISPOSITION ---------------------------------    IMPRESSION  1. Suicidal ideation        DISPOSITION  Disposition: Admit to mental health unit - medically cleared for admission  Patient condition is stable        NOTE: This report was transcribed using voice recognition software.  Every effort was made to ensure accuracy; however, inadvertent computerized transcription errors may be present        Abigail Larsen DO  12/31/19 0120

## 2019-12-31 PROBLEM — F33.2 SEVERE EPISODE OF RECURRENT MAJOR DEPRESSIVE DISORDER, WITHOUT PSYCHOTIC FEATURES (HCC): Status: ACTIVE | Noted: 2019-12-31

## 2019-12-31 LAB
CHOLESTEROL, TOTAL: 249 MG/DL (ref 0–199)
HBA1C MFR BLD: 5.7 % (ref 4–5.6)
HDLC SERPL-MCNC: 73 MG/DL
LDL CHOLESTEROL CALCULATED: 147 MG/DL (ref 0–99)
LITHIUM DOSE AMOUNT: ABNORMAL
LITHIUM LEVEL: <0.1 MMOL/L (ref 0.5–1.5)
TRIGL SERPL-MCNC: 145 MG/DL (ref 0–149)
VALPROIC ACID LEVEL: 3 MCG/ML (ref 50–100)
VLDLC SERPL CALC-MCNC: 29 MG/DL

## 2019-12-31 PROCEDURE — 6370000000 HC RX 637 (ALT 250 FOR IP): Performed by: PSYCHIATRY & NEUROLOGY

## 2019-12-31 PROCEDURE — 83036 HEMOGLOBIN GLYCOSYLATED A1C: CPT

## 2019-12-31 PROCEDURE — 36415 COLL VENOUS BLD VENIPUNCTURE: CPT

## 2019-12-31 PROCEDURE — 99222 1ST HOSP IP/OBS MODERATE 55: CPT | Performed by: NURSE PRACTITIONER

## 2019-12-31 PROCEDURE — 6370000000 HC RX 637 (ALT 250 FOR IP): Performed by: NURSE PRACTITIONER

## 2019-12-31 PROCEDURE — 80164 ASSAY DIPROPYLACETIC ACD TOT: CPT

## 2019-12-31 PROCEDURE — 99221 1ST HOSP IP/OBS SF/LOW 40: CPT | Performed by: PSYCHIATRY & NEUROLOGY

## 2019-12-31 PROCEDURE — 80061 LIPID PANEL: CPT

## 2019-12-31 PROCEDURE — 80178 ASSAY OF LITHIUM: CPT

## 2019-12-31 PROCEDURE — 1240000000 HC EMOTIONAL WELLNESS R&B

## 2019-12-31 RX ORDER — TRIAMTERENE AND HYDROCHLOROTHIAZIDE 37.5; 25 MG/1; MG/1
1 CAPSULE ORAL DAILY
Status: DISCONTINUED | OUTPATIENT
Start: 2019-12-31 | End: 2019-12-31 | Stop reason: SDUPTHER

## 2019-12-31 RX ORDER — HYDROCODONE BITARTRATE AND ACETAMINOPHEN 5; 325 MG/1; MG/1
1 TABLET ORAL EVERY 6 HOURS PRN
Status: DISCONTINUED | OUTPATIENT
Start: 2019-12-31 | End: 2020-01-03 | Stop reason: HOSPADM

## 2019-12-31 RX ORDER — AMLODIPINE BESYLATE 5 MG/1
5 TABLET ORAL DAILY
Status: DISCONTINUED | OUTPATIENT
Start: 2019-12-31 | End: 2020-01-03 | Stop reason: HOSPADM

## 2019-12-31 RX ORDER — TRIAMTERENE AND HYDROCHLOROTHIAZIDE 37.5; 25 MG/1; MG/1
1 TABLET ORAL DAILY
Status: DISCONTINUED | OUTPATIENT
Start: 2019-12-31 | End: 2020-01-03 | Stop reason: HOSPADM

## 2019-12-31 RX ORDER — PANTOPRAZOLE SODIUM 40 MG/1
40 TABLET, DELAYED RELEASE ORAL DAILY
Status: DISCONTINUED | OUTPATIENT
Start: 2019-12-31 | End: 2020-01-03 | Stop reason: HOSPADM

## 2019-12-31 RX ORDER — PHENYTOIN SODIUM 100 MG/1
100 CAPSULE, EXTENDED RELEASE ORAL NIGHTLY
Status: DISCONTINUED | OUTPATIENT
Start: 2019-12-31 | End: 2019-12-31

## 2019-12-31 RX ORDER — MIRTAZAPINE 15 MG/1
15 TABLET, FILM COATED ORAL NIGHTLY
Status: DISCONTINUED | OUTPATIENT
Start: 2019-12-31 | End: 2020-01-02

## 2019-12-31 RX ORDER — SUCRALFATE 1 G/1
1 TABLET ORAL 4 TIMES DAILY
Status: DISCONTINUED | OUTPATIENT
Start: 2019-12-31 | End: 2020-01-03 | Stop reason: HOSPADM

## 2019-12-31 RX ORDER — MEMANTINE HYDROCHLORIDE 5 MG/1
5 TABLET ORAL EVERY EVENING
Status: DISCONTINUED | OUTPATIENT
Start: 2019-12-31 | End: 2020-01-03 | Stop reason: HOSPADM

## 2019-12-31 RX ORDER — CHOLECALCIFEROL (VITAMIN D3) 50 MCG
2000 TABLET ORAL DAILY
Status: DISCONTINUED | OUTPATIENT
Start: 2019-12-31 | End: 2020-01-03 | Stop reason: HOSPADM

## 2019-12-31 RX ORDER — LEVETIRACETAM 500 MG/1
1000 TABLET ORAL 2 TIMES DAILY
Status: DISCONTINUED | OUTPATIENT
Start: 2019-12-31 | End: 2020-01-03 | Stop reason: HOSPADM

## 2019-12-31 RX ADMIN — Medication 2000 UNITS: at 11:14

## 2019-12-31 RX ADMIN — LEVETIRACETAM 1000 MG: 500 TABLET ORAL at 20:44

## 2019-12-31 RX ADMIN — TRIAMTERENE AND HYDROCHLOROTHIAZIDE 1 TABLET: 37.5; 25 TABLET ORAL at 11:36

## 2019-12-31 RX ADMIN — AMLODIPINE BESYLATE 5 MG: 5 TABLET ORAL at 09:11

## 2019-12-31 RX ADMIN — MEMANTINE HYDROCHLORIDE 5 MG: 5 TABLET, FILM COATED ORAL at 17:00

## 2019-12-31 RX ADMIN — MIRTAZAPINE 15 MG: 15 TABLET, FILM COATED ORAL at 20:43

## 2019-12-31 RX ADMIN — SUCRALFATE 1 G: 1 TABLET ORAL at 17:00

## 2019-12-31 RX ADMIN — SUCRALFATE 1 G: 1 TABLET ORAL at 20:43

## 2019-12-31 RX ADMIN — PHENYTOIN SODIUM 200 MG: 100 CAPSULE, EXTENDED RELEASE ORAL at 20:44

## 2019-12-31 RX ADMIN — PANTOPRAZOLE SODIUM 40 MG: 40 TABLET, DELAYED RELEASE ORAL at 09:11

## 2019-12-31 RX ADMIN — LEVETIRACETAM 1000 MG: 500 TABLET ORAL at 09:11

## 2019-12-31 RX ADMIN — SUCRALFATE 1 G: 1 TABLET ORAL at 09:11

## 2019-12-31 RX ADMIN — SUCRALFATE 1 G: 1 TABLET ORAL at 12:49

## 2019-12-31 RX ADMIN — HYDROXYZINE PAMOATE 50 MG: 25 CAPSULE ORAL at 20:45

## 2019-12-31 ASSESSMENT — LIFESTYLE VARIABLES: HISTORY_ALCOHOL_USE: NO

## 2019-12-31 ASSESSMENT — PAIN SCALES - GENERAL: PAINLEVEL_OUTOF10: 0

## 2019-12-31 ASSESSMENT — SLEEP AND FATIGUE QUESTIONNAIRES: DO YOU USE A SLEEP AID: NO

## 2019-12-31 NOTE — GROUP NOTE
Group Therapy Note    Date: 12/31/2019    Group Start Time: 1600  Group End Time: 1630  Group Topic: Group Therapy    [facility] 7W ACUTE BH 2    Min Osborne RN    Patient attended and participated in group.     Group Therapy Note    Attendees: 5/11     Signature:  Surya Maurer RN

## 2019-12-31 NOTE — PROGRESS NOTES
300-330    Pt attended and participated in leisure group of soccer ball [place]. Pt was 1 out of 5 in attendance.

## 2019-12-31 NOTE — PROGRESS NOTES
Occupational Therapy    OT orders received. Chart reviewed. Per speaking with RN, pt is pt completes ADLs/functional mobility/transfers Mod I, demo'ing no OT needs at this time. Please re-consult if there is a change in status.        Cm Yao [place] Sara, OTR/L [id number]

## 2019-12-31 NOTE — CONSULTS
Phoebe Houston is a 62 y.o. male       Chief Complaint   Patient presents with    Depression     denies SI at present, pink slipped. recent anniversary of wifes death     HPI:    62year old man with long history of epiilepsy well controlled on dilantin and keppra. Presents with concern for SI. No recent seizures, no AED side effects. No recent changes to seizure medications. He follows for his epilepsy at [facility]. HPI  Prior to Visit Medications    Medication Sig Taking? Authorizing Provider   HYDROcodone-acetaminophen (NORCO) 5-325 MG per tablet Take 1 tablet by mouth 4 times daily as needed. Yes Historical Provider, MD   amLODIPine (NORVASC) 5 MG tablet Take 1 tablet by mouth daily Yes Atif Renee MD   triamterene-hydrochlorothiazide (DYAZIDE) 37.5-25 MG per capsule Take 1 capsule by mouth daily Yes Atif Renee MD   pantoprazole (PROTONIX) 40 MG tablet Take 1 tablet by mouth daily Yes Che Ureña MD   sucralfate (CARAFATE) 1 GM tablet Take 1 tablet by mouth 4 times daily Yes Lisa Sauer MD   Cholecalciferol (VITAMIN D3) 2000 units CAPS Take 1 capsule by mouth daily Yes Historical Provider, MD   levETIRAcetam (KEPPRA) 1000 MG tablet 1000 mg 2 x day Yes Historical Provider, MD   DILANTIN 30 MG ER capsule TAKE TWO CAPSULES BY MOUTH AT BEDTIME  Patient taking differently: TAKE two caps at bedtime in addition to 100mg capsule ([id number]) Yes Atif Renee MD   phenytoin (DILANTIN) 100 MG ER capsule Take 1 capsule by mouth 2 times daily  Patient taking differently: Take 100 mg by mouth nightly takes one 100mg Yes Jacob Steele MD   famotidine (PEPCID) 20 MG tablet Take 1 tablet by mouth 2 times daily for 7 days  Che Ureña MD   Calcium-Vitamin D 600-125 MG-UNIT TABS Take 1 tablet by mouth daily.   Historical Provider, MD     Social History     Tobacco Use    Smoking status: Never Smoker    Smokeless tobacco: Never Used   Substance Use Topics    Alcohol use: No     Comment: occasionally    Drug imaging studies at today's appointment. Assessment:     Chronic epilepsy well controlled. Patient Active Problem List   Diagnosis    Syncope    Seizure disorder ([address]    Atypical chest pain    Anxiety    Essential hypertension    Hyperlipidemia LDL goal <100    Bilateral carpal tunnel syndrome    Ulnar neuropathy of both upper extremities    Gastroesophageal reflux disease with esophagitis    Closed displaced fracture of triquetrum of left wrist    Acute depression    Severe episode of recurrent major depressive disorder, without psychotic features ([address]       Plan:     Continue current aeds. Follow up with outside neurologist on routine basis.        Cole Sales  3:38 PM  12/31/2019

## 2019-12-31 NOTE — PROGRESS NOTES
Therapy Note    Date: 12/31/2019  Start Time: 1:30  End Time:  2:45  Number of Participants: 3    Type of Group: Psychotherapy    Wellness Binder Information  Module Name:   Session Number:     Patient's Goal: Gain insight into interpersonal relationships by interacting with others    Notes: Pt was able to verbalize strong feelings regarding events that traumatized during their childhood. and how it impacts them today.  Pt received support and encouragement from the group    Status After Intervention:  Improved    Participation Level: Interactive    Participation Quality: Attentive and Sharing      Speech: normal      Thought Process/Content: Logical      Affective Functioning: Congruent      Mood: anxious and depressed      Level of consciousness:  Attentive and Drowsy      Response to Learning: Able to verbalize/acknowledge new learning      Endings: None Reported    Modes of Intervention: Support and Socialization      Discipline Responsible: /Counselor      Signature:  LEEANNE Ricci

## 2019-12-31 NOTE — H&P
Problem Relation Age of Onset    Diabetes Father     Cancer Maternal Grandmother            [x] Denies       [] Endorses               [] Father                [] Depression  [] Anxiety  [] Bipolar  [] Psychosis  []  Other                  [] Mother               [] Depression  [] Anxiety  [] Bipolar  [] Psychosis  []  Other                  [] Sibling               [] Depression  [] Anxiety  [] Bipolar  [] Psychosis  []  Other                  [] Grandparent               [] Depression  [] Anxiety  [] Bipolar  [] Psychosis  []  Other       SOCIAL HISTORY:     1. Living Situation:[x] Private Residence [] Homeless [] [address]             [] Assisted Living [] [address]  [] Shelter [] Other   2. Employment:  [] Unemployed  [] Employed  [] Disabled  [x] Retired   1. Legal History: [x] No Arrest [] Arrest  [] Theft  []  Assault  [] Substances   4. History of Trama/ Abuse: [x] Denies  [] Emotional [] Physical [] Sexual   5. Spirituality: [x] Spiritual [] Not Spiritual   6. Substance Abuse: [x] Denies  [] Drug of choice      [] Amphetamines [] Marijuana [] Cocaine      [] Opioids  [] Alcohol  [] Benzodiazepines     For further SH review SW note. Risk Assessment:  1. Risk Factors:   [x] Depression  [x] Anxiety  [] Psychosis   [x] Suicidal/Homicidal Thoughts [] Suicide Attempt [] Substance Abuse     2. Protective Factors: [x] Controlled Environment         [] Supportive Family []         [] Bahai Support     3. Level of Risk: [] Mild [] Moderate [x] Severe      Strengths & Weaknesses:    1. Strengths: [x] Ability to communicate feelings     [x] Independent ADL's     [] Supportive Family    [] Current Health Status     2.  Weaknesses: [x] Emotional          [] Motivational     MEDICATIONS: Current Facility-Administered Medications: amLODIPine (NORVASC) tablet 5 mg, 5 mg, Oral, Daily  vitamin D tablet 2,000 Units, 2,000 Units, Oral, Daily  HYDROcodone-acetaminophen (NORCO) 5-325 MG per tablet 1 tablet, 1 ALLERGIES: Phenobarbital            Physical Examination:    Head:  [x] Atraumatic:  [x] normocephalic  Skin and Mucosa       [] Moist [] Dry [] Pale [x] Normal   Neck: [x] Thyroid [] Palpable    [x] Not palpable []  venus distention [] adenopathy   Chest: [x] Clear [] Rhonchi  [] Wheezing   CV: [x] S1 [x] S2 [x] No murmer   Abdomen:  [x] Soft   [] Tender  [] Viceromegaly   Extremities:  [x] No Edema   [] Edema    Cranial Nerves Examination:    CN II: [x] Pupils are reactive to light [] Pupils are non reactive to light  CN III, IV, VI:[x] No eye deviation  [x] No diplopia or ptosis   CN V: [x] Facial Sensation is intact  [] Facial Sensation is not intact   CN IIIV:  [x] Hearing is normal to rubbing fingers   CN IX, X:  [x] Normal gag reflex and phonation   CN XI: [x] Shoulder shrug and neck rotation is normal  CNXII: [x] Tongue is midline no deviation or atrophy       For further PE refer to ED note    MENTAL STATUS EXAM:       Mental Status Examination:    Cognition:      [x] Alert  [x] Awake  [x] Oriented  [x] Person  [x] Place [x] Time      [] drowsy  [] tired  [] lethargic  [] distractable  []     Attention/Concentration:   [x] Attentive  [] Distracted        Memory Recent and Remote: [x] Intact   [] Impaired [] Partially Impaired     Language: [] Able to recognize and name objects          [] Unable to recognize and name Objects    Fund of Knowledge:  [] Poor []  Fair  [x] Good    Speech: [x] Normal  [] Soft  [] Slow  [] Fast [] Pressured            [] Loud [] Dysarthria  [] Incoherent       Appearance: [] Well Groomed  [x] Casual Dressed  [] Unkept  [] Disheveled          [] Normal weight[] Thin  [] Overweight  [] Obese           Attitude: [] Positive  [] Hostile  [] Demanding  [] Guarded  [] Defensive         [x] Cooperative  []  Uncooperative      Behavior:  [x] Normal Gait  [] Walks with Assistance  [] Sheela Chair    [] Walks with [employer]  [] In Hospital Bed  [] Sitting in Chair    Muscle-Skeletal:  [x]

## 2019-12-31 NOTE — GROUP NOTE
Group Therapy Note    Date: 12/31/2019    Group Start Time: 1000  Group End Time: 1050  Group Topic: Psychoeducation    [facility]    Lashanda Mix, CTRS        Group Therapy Note      Number of participants: 6  Type of group: Psychoeducation  Mode of intervention: Education, Support, Socialization, Exploration, Clarifying, Problem-solving, and Activity  Topic: Mindfulness Response to Thoughts: APPLE  Objective: Pt will identify ways to utilize the acronym APPLE in recovery. Patient's Goal:  \"Make sure I am happy\"     Notes: Pt was interactive during group sharing ways to utilize APPLE in recovery. Pt gave support and feedback to others. Status After Intervention:  Improved    Participation Level:  Active Listener and Interactive    Participation Quality: Appropriate, Attentive, Sharing and Supportive      Speech:  normal      Thought Process/Content: Logical      Affective Functioning: Congruent      Mood: euthymic      Level of consciousness:  Alert, Oriented x4 and Attentive      Response to Learning: Able to verbalize current knowledge/experience, Able to verbalize/acknowledge new learning, Able to retain information, Capable of insight, Able to change behavior and Progressing to goal      Endings: None Reported    Modes of Intervention: Education, Support, Socialization, Exploration, Clarifying, Problem-solving and Activity

## 2020-01-01 LAB
EKG ATRIAL RATE: 70 BPM
EKG P AXIS: 50 DEGREES
EKG P-R INTERVAL: 134 MS
EKG Q-T INTERVAL: 382 MS
EKG QRS DURATION: 90 MS
EKG QTC CALCULATION (BAZETT): 412 MS
EKG R AXIS: -2 DEGREES
EKG T AXIS: -3 DEGREES
EKG VENTRICULAR RATE: 70 BPM

## 2020-01-01 PROCEDURE — 6370000000 HC RX 637 (ALT 250 FOR IP): Performed by: PSYCHIATRY & NEUROLOGY

## 2020-01-01 PROCEDURE — 1240000000 HC EMOTIONAL WELLNESS R&B

## 2020-01-01 PROCEDURE — 6370000000 HC RX 637 (ALT 250 FOR IP): Performed by: NURSE PRACTITIONER

## 2020-01-01 PROCEDURE — 99231 SBSQ HOSP IP/OBS SF/LOW 25: CPT | Performed by: NURSE PRACTITIONER

## 2020-01-01 PROCEDURE — 93010 ELECTROCARDIOGRAM REPORT: CPT | Performed by: INTERNAL MEDICINE

## 2020-01-01 RX ADMIN — PANTOPRAZOLE SODIUM 40 MG: 40 TABLET, DELAYED RELEASE ORAL at 08:54

## 2020-01-01 RX ADMIN — MIRTAZAPINE 15 MG: 15 TABLET, FILM COATED ORAL at 21:14

## 2020-01-01 RX ADMIN — MEMANTINE HYDROCHLORIDE 5 MG: 5 TABLET, FILM COATED ORAL at 17:41

## 2020-01-01 RX ADMIN — PHENYTOIN SODIUM 200 MG: 100 CAPSULE, EXTENDED RELEASE ORAL at 21:14

## 2020-01-01 RX ADMIN — SUCRALFATE 1 G: 1 TABLET ORAL at 17:41

## 2020-01-01 RX ADMIN — LEVETIRACETAM 1000 MG: 500 TABLET ORAL at 21:15

## 2020-01-01 RX ADMIN — SUCRALFATE 1 G: 1 TABLET ORAL at 21:14

## 2020-01-01 RX ADMIN — SUCRALFATE 1 G: 1 TABLET ORAL at 12:50

## 2020-01-01 RX ADMIN — TRIAMTERENE AND HYDROCHLOROTHIAZIDE 1 TABLET: 37.5; 25 TABLET ORAL at 08:54

## 2020-01-01 RX ADMIN — AMLODIPINE BESYLATE 5 MG: 5 TABLET ORAL at 08:54

## 2020-01-01 RX ADMIN — Medication 2000 UNITS: at 08:54

## 2020-01-01 RX ADMIN — SUCRALFATE 1 G: 1 TABLET ORAL at 08:54

## 2020-01-01 RX ADMIN — LEVETIRACETAM 1000 MG: 500 TABLET ORAL at 08:54

## 2020-01-01 ASSESSMENT — PAIN SCALES - GENERAL
PAINLEVEL_OUTOF10: 0
PAINLEVEL_OUTOF10: 0

## 2020-01-01 NOTE — PROGRESS NOTES
eff denies any SI, HI, or any Hallucinations at this time. Patient denies any depression or anxiety and states good appetite. Patient is alert and oriented X4. 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 Hero Reddy is taking his meds and going to groups. Groups continue to be offered and encouraged.

## 2020-01-01 NOTE — GROUP NOTE
Group Therapy Note    Date: 1/1/2020    Group Start Time: 1115  Group End Time: 0847  Group Topic: Psychoeducation    SE 7W JO Man        Group Therapy Note    Attendees: 6     Notes: Pleasant and social during discussion on healthy boundaries. Able to relate and share with peers. Status After Intervention:  Improved    Participation Level:  Active Listener and Interactive    Participation Quality: Appropriate, Attentive and Sharing      Speech:  normal      Thought Process/Content: Logical      Affective Functioning: Congruent      Mood: euthymic      Level of consciousness:  Alert and Attentive      Response to Learning: Capable of insight, Able to change behavior and Progressing to goal      Endings: None Reported    Modes of Intervention: Education, Support, Socialization and Exploration      Discipline Responsible: Psychoeducational Specialist      Signature:  Martín Wilcox, [address]

## 2020-01-01 NOTE — PROGRESS NOTES
Review of systems  Delusions:  [] Denies [] Endorses   Withdrawals:  [] Denies [] Endorses    Hallucinations: [] Denies [] Endorses    Extra Pyramidal Symptoms: [] Denies [] Endorses      /68   Pulse 85   Temp 97.4 °F (36.3 °C) (Temporal)   Resp 18   Ht 5' 8\" (1.727 m)   Wt 180 lb (81.6 kg)   SpO2 97%   BMI 27.37 kg/m²     Mental Status Examination:    Cognition:      [x] Alert  [x] Awake  [x] Oriented  [x] Person  [x] Place [x] Time      [] drowsy  [] tired  [] lethargic  [] distractable  [] Other     Attention/Concentration:   [x] Attentive  [] Distracted        Memory Recent and Remote: [x] Intact   [] Impaired [] Partially Impaired     Language: [] Able to recognize and name objects          [] Unable to recognize and name Objects    Fund of Knowledge:  [] Poor []  Fair  [x] Good    Speech: [x] Normal  [] Soft  [] Slow  [] Fast [] Pressured            [] Loud [] Dysarthria  [] Incoherent    Appearance: [] Well Groomed  [x] Casual Dressed  [] Unkept  [] Disheveled          [] Normal weight[] Thin  [] Overweight  [] Obese           Attitude: [] Positive  [] Hostile  [] Demanding  [] Guarded  [] Defensive         [x] Cooperative  []  Uncooperative      Behavior:  [] Normal Gait  [] Walks with Assistance  [] Sheela Chair    [] Walks with Oumar Chino  [x] In Hospital Bed  [] Sitting in Chair    Muscle-Skeletal:  [x] Normal Muscle Tone [] Muscle Atrophy       [] Abnormal Muscle Movement     Eye Contact:  [x] Good eye contact  [] Intermittent Eye Contact  [] Poor Eye Contact     Mood: [x] Depressed  [x] Anxious  [] Irritated  [] Euthymic   [] Angry [] Restless    Affect:  [x] Congruent  [] Incongruent  [] Labile  [] Constricted  [] Flat  [] Bizarre     Thought Process and Association:  [] Logical [] Illogical       [x] Linear and Goal Directed  [] Tangential  [] Circumstantial     Thought Content:  [x] Denies [] Endorses [] Suicidal [] Homicidal  [] Delusional      [] Paranoid  [] Somatic  [] Grandiose    Perception: [x]  None  [] Auditory   [] Visual  [] tactile   [] olfactory  [] Illusions         Insight: [] Intact  [] Fair  [x] Limited    Judgement:  [] Intact  [] Fair  [x] Limited      Assessment/Plan:        Patient Active Problem List   Diagnosis Code    Syncope R55    [facility] G40.909    Atypical chest pain R07.89    Anxiety F41.9    Essential hypertension I10    Hyperlipidemia LDL goal <100 E78.5    Bilateral carpal tunnel syndrome G56.03    Ulnar neuropathy of both upper extremities G56.23    Gastroesophageal reflux disease with esophagitis K21.0    Closed displaced fracture of triquetrum of left wrist S62.112A    Acute depression F32.9    Severe episode of recurrent major depressive disorder, without psychotic features ([facility] F33.2         Plan:    []  Patient is refusing medications  [x] Improving as expected   [] Not improving as expected   [] Worsening    []  At Baseline       Reason for more than one antipsychotic:  [x] N/A  [] 3 failed monotherapy(drugs tried):  [] Cross over to a new antipsychotic  [] Taper to monotherapy from polypharmacy  [] Augmentation of Clozapine therapy due to treatment resistance to single therapy      Signed:  Mariangel Julio  1/1/2020  8:39 AM

## 2020-01-02 PROCEDURE — 6370000000 HC RX 637 (ALT 250 FOR IP): Performed by: PSYCHIATRY & NEUROLOGY

## 2020-01-02 PROCEDURE — 6370000000 HC RX 637 (ALT 250 FOR IP): Performed by: NURSE PRACTITIONER

## 2020-01-02 PROCEDURE — 99232 SBSQ HOSP IP/OBS MODERATE 35: CPT | Performed by: NURSE PRACTITIONER

## 2020-01-02 PROCEDURE — 1240000000 HC EMOTIONAL WELLNESS R&B

## 2020-01-02 RX ORDER — VENLAFAXINE HYDROCHLORIDE 37.5 MG/1
37.5 CAPSULE, EXTENDED RELEASE ORAL
Status: DISCONTINUED | OUTPATIENT
Start: 2020-01-02 | End: 2020-01-03 | Stop reason: HOSPADM

## 2020-01-02 RX ORDER — MIRTAZAPINE 15 MG/1
30 TABLET, FILM COATED ORAL NIGHTLY
Status: DISCONTINUED | OUTPATIENT
Start: 2020-01-02 | End: 2020-01-03 | Stop reason: HOSPADM

## 2020-01-02 RX ADMIN — SUCRALFATE 1 G: 1 TABLET ORAL at 21:33

## 2020-01-02 RX ADMIN — MIRTAZAPINE 30 MG: 15 TABLET, FILM COATED ORAL at 21:33

## 2020-01-02 RX ADMIN — PHENYTOIN SODIUM 200 MG: 100 CAPSULE, EXTENDED RELEASE ORAL at 21:33

## 2020-01-02 RX ADMIN — LEVETIRACETAM 1000 MG: 500 TABLET ORAL at 21:32

## 2020-01-02 RX ADMIN — TRIAMTERENE AND HYDROCHLOROTHIAZIDE 1 TABLET: 37.5; 25 TABLET ORAL at 11:03

## 2020-01-02 RX ADMIN — Medication 2000 UNITS: at 11:03

## 2020-01-02 RX ADMIN — PANTOPRAZOLE SODIUM 40 MG: 40 TABLET, DELAYED RELEASE ORAL at 11:05

## 2020-01-02 RX ADMIN — AMLODIPINE BESYLATE 5 MG: 5 TABLET ORAL at 11:03

## 2020-01-02 RX ADMIN — SUCRALFATE 1 G: 1 TABLET ORAL at 11:04

## 2020-01-02 RX ADMIN — LEVETIRACETAM 1000 MG: 500 TABLET ORAL at 11:05

## 2020-01-02 RX ADMIN — SUCRALFATE 1 G: 1 TABLET ORAL at 18:53

## 2020-01-02 RX ADMIN — SUCRALFATE 1 G: 1 TABLET ORAL at 14:24

## 2020-01-02 RX ADMIN — VENLAFAXINE HYDROCHLORIDE 37.5 MG: 37.5 CAPSULE, EXTENDED RELEASE ORAL at 11:04

## 2020-01-02 RX ADMIN — MEMANTINE HYDROCHLORIDE 5 MG: 5 TABLET, FILM COATED ORAL at 18:53

## 2020-01-02 ASSESSMENT — PAIN SCALES - GENERAL: PAINLEVEL_OUTOF10: 0

## 2020-01-02 NOTE — PROGRESS NOTES
Group Therapy Note    Patient attended goals group and stated daily goal as to take care of myself better.                                                                         Group Therapy Note     Date: 1/2/2020  Start Time: 10:00  End Time:  10:30  Number of Participants: 10     Type of Group: Psychoeducation     Wellness Binder Information  Module Name: Coping skills  Session Number: NA     Patient's Goal: To id positive coping skills to use on a daily basis.     Notes: Attended group and was able to participate in discussion.     Status After Intervention:  Improved    Participation Level:  Active Listener and Interactive    Participation Quality: Attentive and Sharing      Speech:  normal      Thought Process/Content: Logical      Affective Functioning: Flat      Mood: anxious and depressed      Level of consciousness:  Alert      Response to Learning: Progressing to goal      Endings: None Reported    Modes of Intervention: Education      Discipline Responsible: Psychoeducational Specialist      Signature:  Ester Cowden, LSW

## 2020-01-02 NOTE — PROGRESS NOTES
None  [] Auditory   [] Visual  [] tactile   [] olfactory  [] Illusions         Insight: [] Intact  [] Fair  [x] Limited    Judgement:  [] Intact  [] Fair  [x] Limited      Assessment/Plan:        Patient Active Problem List   Diagnosis Code    Syncope R55    Seizure disorder ([facility] G40.909    Atypical chest pain R07.89    Anxiety F41.9    Essential hypertension I10    Hyperlipidemia LDL goal <100 E78.5    Bilateral carpal tunnel syndrome G56.03    Ulnar neuropathy of both upper extremities G56.23    Gastroesophageal reflux disease with esophagitis K21.0    Closed displaced fracture of triquetrum of left wrist S62.112A    Acute depression F32.9    Severe episode of recurrent major depressive disorder, without psychotic features ([facility] F33.2         Plan:    []  Patient is refusing medications  [x] Improving as expected   [] Not improving as expected   [] Worsening    []  At Baseline     Will start Effexor XR 37.5 mg and increase Remeron to 30 mg    Reason for more than one antipsychotic:  [x] N/A  [] 3 failed monotherapy(drugs tried):  [] Cross over to a new antipsychotic  [] Taper to monotherapy from polypharmacy  [] Augmentation of Clozapine therapy due to treatment resistance to single therapy      Signed:  Tika Nicholas  1/2/2020  10:45 AM

## 2020-01-03 VITALS
BODY MASS INDEX: 27.28 KG/M2 | RESPIRATION RATE: 14 BRPM | HEART RATE: 74 BPM | HEIGHT: 68 IN | SYSTOLIC BLOOD PRESSURE: 133 MMHG | OXYGEN SATURATION: 94 % | WEIGHT: 180 LBS | DIASTOLIC BLOOD PRESSURE: 66 MMHG | TEMPERATURE: 98.9 F

## 2020-01-03 PROCEDURE — 6370000000 HC RX 637 (ALT 250 FOR IP): Performed by: NURSE PRACTITIONER

## 2020-01-03 PROCEDURE — 99238 HOSP IP/OBS DSCHRG MGMT 30/<: CPT | Performed by: NURSE PRACTITIONER

## 2020-01-03 RX ORDER — MIRTAZAPINE 30 MG/1
30 TABLET, FILM COATED ORAL NIGHTLY
Qty: 30 TABLET | Refills: 0 | Status: SHIPPED | OUTPATIENT
Start: 2020-01-03

## 2020-01-03 RX ORDER — VENLAFAXINE HYDROCHLORIDE 37.5 MG/1
37.5 CAPSULE, EXTENDED RELEASE ORAL
Qty: 30 CAPSULE | Refills: 0 | Status: SHIPPED | OUTPATIENT
Start: 2020-01-04

## 2020-01-03 RX ORDER — MEMANTINE HYDROCHLORIDE 5 MG/1
5 TABLET ORAL EVERY EVENING
Qty: 60 TABLET | Refills: 0 | Status: SHIPPED | OUTPATIENT
Start: 2020-01-03

## 2020-01-03 RX ADMIN — VENLAFAXINE HYDROCHLORIDE 37.5 MG: 37.5 CAPSULE, EXTENDED RELEASE ORAL at 09:10

## 2020-01-03 RX ADMIN — AMLODIPINE BESYLATE 5 MG: 5 TABLET ORAL at 09:10

## 2020-01-03 RX ADMIN — PANTOPRAZOLE SODIUM 40 MG: 40 TABLET, DELAYED RELEASE ORAL at 09:11

## 2020-01-03 RX ADMIN — SUCRALFATE 1 G: 1 TABLET ORAL at 09:11

## 2020-01-03 RX ADMIN — TRIAMTERENE AND HYDROCHLOROTHIAZIDE 1 TABLET: 37.5; 25 TABLET ORAL at 09:10

## 2020-01-03 RX ADMIN — Medication 2000 UNITS: at 09:10

## 2020-01-03 RX ADMIN — SUCRALFATE 1 G: 1 TABLET ORAL at 13:34

## 2020-01-03 RX ADMIN — LEVETIRACETAM 1000 MG: 500 TABLET ORAL at 09:11

## 2020-01-03 NOTE — PLAN OF CARE
[facility]  Initial Interdisciplinary Treatment Plan NOTE    Review Date & Time: 12/31/19 0800    Patient was in treatment team    Admission Type:   Admission Type: Involuntary    Reason for admission:  Reason for Admission: \"Just arguing and arguing, I wanted to drive to [facility] but my brother did not wnat me to so now I'm here\"      Estimated Length of Stay Update:  3-5 days  Estimated Discharge Date Update: 01/03/20    PATIENT STRENGTHS:  Patient Strengths Strengths: Communication, Positive Support  Patient Strengths and Limitations:Limitations: Tendency to isolate self  Addictive Behavior:Addictive Behavior  In the past 3 months, have you felt or has someone told you that you have a problem with:  : Eating (too much/too little)  Do you have a history of Chemical Use?: No  Do you have a history of Alcohol Use?: No  Do you have a history of Street Drug Abuse?: No  Histroy of Prescripton Drug Abuse?: No  Medical Problems:  Past Medical History:   Diagnosis Date    Epile[facility]     Essential hypertension 10/11/2015    GERD (gastroesophageal reflux disease)     Hyperlipidemia LDL goal <100 10/11/2015    Seizures ([facility]        EDUCATION:   Learner Progress Toward Treatment Goals: Reviewed results and recommendations of this team    Method: Small group    Outcome: Verbalized understanding    PATIENT GOALS: Make sure I am happy    PLAN/TREATMENT RECOMMENDATIONS UPDATE: Encourage group participation and continue to provide emotional support. GOALS UPDATE:   Time frame for Short-Term Goals: 1-3 days.      Eriberto Escobar RN
Problem: Altered Mood, Depressive Behavior:  Goal: Able to verbalize and/or display a decrease in depressive symptoms  Description  Able to verbalize and/or display a decrease in depressive symptoms  Outcome: Met This Shift  Goal: Absence of self-harm  Description  Absence of self-harm  1/3/2020 1303 by Swapna Chacon RN  Outcome: Met This Shift  1/3/2020 0443 by Taiwo Rosenthal RN  Outcome: Met This Shift     Problem: Altered Mood, Depressive Behavior:  Goal: Able to verbalize support systems  Description  Able to verbalize support systems  Outcome: Ongoing    Cary Bobo denies any SI, HI, or any Hallucinations at this time. Patient attending groups and taking all meds. Patient has been calm and cooperative. Groups are offered and encouraged. Discharge order in place for home. Will continue to monitor at this time.
Problem: Altered Mood, Depressive Behavior:  Goal: Absence of self-harm  Description  Absence of self-harm  1/2/2020 1257 by Arlin Loera RN  Outcome: Met This Shift  3/8/6728 4581 by Joann Willson RN  Outcome: Met This Shift     Problem: Altered Mood, Depressive Behavior:  Goal: Able to verbalize and/or display a decrease in depressive symptoms  Description  Able to verbalize and/or display a decrease in depressive symptoms  1/2/2020 1257 by Arlin Loera RN  Outcome: Ongoing  5/2/7343 5999 by Joann Willson RN  Outcome: Met This Shift     Patient denies SI/HI/Hallucinations. Patient appears anxious, flat, and evasive during conversation. Patient isolative to himself when out on the unit. Patient calm and cooperative. Patient taking prescribed medications, eating provided meals, and attending groups.
Pt denies SI HI and hallucinations. Pt is flat, anxious, worried. Pt is social with staff and others on the unit. Pt is medication compliant, eating provided meals and attending select groups. No aggression. We will continue to provide support and comfort for the patient.      Problem: Altered Mood, Depressive Behavior:  Goal: Able to verbalize and/or display a decrease in depressive symptoms  Description  Able to verbalize and/or display a decrease in depressive symptoms  Outcome: Met This Shift     Problem: Altered Mood, Depressive Behavior:  Goal: Able to verbalize support systems  Description  Able to verbalize support systems  Outcome: Met This Shift     Problem: Altered Mood, Depressive Behavior:  Goal: Absence of self-harm  Description  Absence of self-harm  Outcome: Met This Shift
[address] [address]

## 2020-01-03 NOTE — PROGRESS NOTES
Group Therapy Note    Patient attended goals group and stated daily goal as to start to help myself more. Group Therapy Note    Date: 1/3/2020  Start Time: 10:00  End Time: 10:30  Number of Participants: 9    Type of Group: Psychoeducation    Wellness Binder Information  Module Name: Coping Skills  Session Number: NA    Patient's Goal: To is positive coping skills to use on a daily basis. Notes: Attended group and was able to participate from his bedroom. Status After Intervention:  Improved    Participation Level:  Active Listener and Interactive    Participation Quality: Attentive      Speech:  hesitant      Thought Process/Content: Logical      Affective Functioning: Flat      Mood: anxious and depressed      Level of consciousness:  Alert      Response to Learning: Progressing to goal      Endings: None Reported    Modes of Intervention: Education      Discipline Responsible: Psychoeducational Specialist      Signature:  HANG Bustillos

## 2020-01-03 NOTE — PROGRESS NOTES
[facility]  Discharge Note    Pt discharged with followings belongings:   Dentures: Uppers  Vision - Corrective Lenses: Glasses  Hearing Aid: None  Jewelry: None  Clothing: Socks, Pants, Shirt, Undergarments (Comment)(6 shirts 2 pair of pants 3 pair of socks 2 pants )  Were All Patient Medications Collected?: Not Applicable  Other Valuables: Oanh Bowman (Comment)(wallet w ID'S,  credit cards. cash $210)   Valuables sent home with patient . Valuables retrieved from Security returned to patient (wallet). Patient education on aftercare instructions: yes. Patient verbalize understanding of AVS:  yes.     Status EXAM upon discharge:  Status and Exam  Normal: No  Facial Expression: Sad, Flat  Affect: Congruent  Level of Consciousness: Alert  Mood:Normal: No  Mood: Anxious, Sad  Motor Activity:Normal: No  Motor Activity: Decreased  Interview Behavior: Cooperative  Preception: [place] to Person, [place] Stager to Time, [place] to Place, [place] to Situation  Attention:Normal: No  Attention: Distractible  Thought Processes: Circumstantial  Thought Content:Normal: No  Thought Content: Preoccupations  Hallucinations: None  Delusions: No  Memory:Normal: No  Memory: Poor Recent  Insight and Judgment: No  Insight and Judgment: Poor Judgment, Poor Insight  Present Suicidal Ideation: No  Present Homicidal Ideation: No      Metabolic Screening:    Lab Results   Component Value Date    LABA1C 5.7 (H) 12/31/2019       Lab Results   Component Value Date    CHOL 249 (H) 12/31/2019    CHOL 259 02/23/2016    CHOL 185 09/26/2013     Lab Results   Component Value Date    TRIG 145 12/31/2019    TRIG 159 02/23/2016    TRIG 94 09/26/2013     Lab Results   Component Value Date    HDL 73 12/31/2019    HDL 95 (A) 02/23/2016    HDL 76.0 09/26/2013     No components found for: [facility] [place]  Lab Results   Component Value Date    LABVLDL 29 12/31/2019       Kisha Lebron RN

## 2020-01-03 NOTE — PROGRESS NOTES
No  Present Homicidal Ideation: No    Daily Assessment Last Entry:   Daily Sleep (WDL): Exceptions to WDL         Patient Currently in Pain: Denies  Daily Nutrition (WDL): Within Defined Limits    Patient Monitoring:  Frequency of Checks: 4 times per hour, close    Psychiatric Symptoms:   Depression Symptoms  Depression Symptoms: Impaired concentration, Isolative  Anxiety Symptoms  Anxiety Symptoms: Generalized  Omayra Symptoms  Omayra Symptoms: No problems reported or observed. Psychosis Symptoms  Delusion Type: No problems reported or observed. Suicide Risk CSSR-S:  1) Within the past month, have you wished you were dead or wished you could go to sleep and not wake up? : No  2) Have you actually had any thoughts of killing yourself? : No  6) Have you ever done anything, started to do anything, or prepared to do anything to end your life?: No  Change in Result No Change in Plan of care No      EDUCATION:   Learner Progress Toward Treatment Goals: Reviewed results and recommendations of this team and Reviewed goals and plan of care    Method: Small group    Outcome: Verbalized understanding    PATIENT GOALS: \"Take care of myself better. \"    PLAN/TREATMENT RECOMMENDATIONS UPDATE: Take prescribed medications, attend/participate in groups. Continue to provide emotional support to patient.     GOALS UPDATE:   Time frame for Short-Term Goals: 1-3 days      Frank Jin RN

## 2020-01-03 NOTE — PROGRESS NOTES
Morelia Penaloza denies any SI, HI, or any Hallucinations at this time. Patient attending groups and taking all meds. Patient has been calm and cooperative. Groups are offered and encouraged. Discharge order in place for home. Will continue to monitor at this time.

## 2020-01-03 NOTE — PROGRESS NOTES
Spoke with Dr. Colleen Mast regarding patients Dilantin on discharge meds summary. Orders to continue as patient stated he takes at home. Corrected on summary.

## 2020-01-03 NOTE — DISCHARGE SUMMARY
Physician Discharge Summary      Physical Examination   VS/Measurements:     /66   Pulse 74   Temp 98.9 °F (37.2 °C) (Oral)   Resp 14   Ht 5' 8\" (1.727 m)   Wt 180 lb (81.6 kg)   SpO2 94%   BMI 27.37 kg/m²         Discharge Medications:              Radha    Home Medication Instructions PB:[id number]    Printed on:01/03/20 6022   Medication Information                      amLODIPine (NORVASC) 5 MG tablet  Take 1 tablet by mouth daily             Cholecalciferol (VITAMIN D3) 2000 units CAPS  Take 1 capsule by mouth daily             DILANTIN 30 MG ER capsule  TAKE TWO CAPSULES BY MOUTH AT BEDTIME             HYDROcodone-acetaminophen (NORCO) 5-325 MG per tablet  Take 1 tablet by mouth 4 times daily as needed.              levETIRAcetam (KEPPRA) 1000 MG tablet  1000 mg 2 x day             memantine (NAMENDA) 5 MG tablet  Take 1 tablet by mouth every evening             mirtazapine (REMERON) 30 MG tablet  Take 1 tablet by mouth nightly             pantoprazole (PROTONIX) 40 MG tablet  Take 1 tablet by mouth daily             phenytoin (DILANTIN) 100 MG ER capsule  Take 1 capsule by mouth 2 times daily             sucralfate (CARAFATE) 1 GM tablet  Take 1 tablet by mouth 4 times daily             triamterene-hydrochlorothiazide (DYAZIDE) 37.5-25 MG per capsule  Take 1 capsule by mouth daily             venlafaxine (EFFEXOR XR) 37.5 MG extended release capsule  Take 1 capsule by mouth daily (with breakfast)                     Psychiatric: Mental Status Examination:    Cognition:      [x] Alert  [x] Awake  [x] Oriented  [x] Person  [x] Place [x] Time    [] drowsy  [] tired  [] lethargic  [] distractable       Attention/Concentration:   [x] Attentive  [] Distracted        Memory Recent and Remote: [x] Intact   [] Impaired [] Partially Impaired     Language: [] Able to recognize and name objects          [] Unable to recognize and name Objects    Fund of Knowledge:  [] Poor []  Fair  [] Good    Speech: [x] Normal  [] Soft  [] Slow  [] Fast [] Pressured            [] Loud [] Dysarthria  [] Incoherent       Appearance: [] Well Groomed  [x] Casual Dressed  [] Unkept  [] Disheveled          [] Normal weight[] Thin  [] Overweight  [] Obese           Attitude: [] Positive  [] Hostile  [] Demanding  [] Guarded  [] Defensive         [x] Cooperative  []  Uncooperative      Behavior:  [x] Normal Gait  [] Walks with Assistance  [] Virginia Carlie    [] Walks with Crystalleeroy Martell  [] In Hospital Bed  [] Sitting in Chair    Muscle-Skeletal:  [x] Normal Muscle Tone [] Muscle Atrophy       [] Abnormal Muscle Movement     Eye Contact:  [x] Good eye contact  [] Intermittent Eye Contact  [] Poor Eye Contact     Mood: [] Depressed  [] Anxious  [] Irritated  [x] Euthymic   [] Angry [] Restless    Affect:  [x] Congruent  [] Incongruent  [] Labile  [] Constricted  [] Flat  [] Bizarre     Thought Process and Association:  [] Logical [] Illogical       [x] Linear and Goal Directed  [] Tangential  [] Circumstantial     Thought Content:  [x] Denies [] Endorses [] Suicidal [] Homicidal  [] Delusional      [] Paranoid  [] Somatic  [] Grandiose    Perception: [x]  None  [] Auditory   [] Visual  [] tactile   [] olfactory  [] Illusions         Insight: [] Intact  [x] Fair  [] Limited    Judgement:  [] Intact  [x] Fair  [] Limited    Hospital Course:   Admit Date: 12/30/2019     Discharge Date: 1/3/2020  Admitted from:  [x]  Emergency Room  []  Home  []  Another facility   []  [facility]     Admitting diagnosis:   Patient Active Problem List   Diagnosis    Syncope    Se[facility] disorder ([facility]    Atypical chest pain    Anxiety    Essential hypertension    Hyperlipidemia LDL goal <100    Bilateral carpal tunnel syndrome    Ulnar neuropathy of both upper extremities    Gastroesophageal reflux disease with esophagitis    Closed displaced fracture of triquetrum of left wrist    Acute depression    Severe episode of recurrent major depressive

## 2024-01-06 ENCOUNTER — HOSPITAL ENCOUNTER (EMERGENCY)
Age: 64
Discharge: HOME | End: 2024-01-06
Attending: EMERGENCY MEDICINE
Payer: MEDICARE

## 2024-01-06 ENCOUNTER — APPOINTMENT (OUTPATIENT)
Dept: CT IMAGING | Age: 64
End: 2024-01-06
Payer: MEDICARE

## 2024-01-06 VITALS
HEART RATE: 80 BPM | OXYGEN SATURATION: 97 % | TEMPERATURE: 98.4 F | DIASTOLIC BLOOD PRESSURE: 84 MMHG | RESPIRATION RATE: 18 BRPM | SYSTOLIC BLOOD PRESSURE: 151 MMHG

## 2024-01-06 DIAGNOSIS — I15.9 SECONDARY HYPERTENSION: ICD-10-CM

## 2024-01-06 DIAGNOSIS — R51.9 NONINTRACTABLE HEADACHE, UNSPECIFIED CHRONICITY PATTERN, UNSPECIFIED HEADACHE TYPE: Primary | ICD-10-CM

## 2024-01-06 PROCEDURE — 99284 EMERGENCY DEPT VISIT MOD MDM: CPT | Performed by: EMERGENCY MEDICINE

## 2024-01-06 PROCEDURE — 70450 CT HEAD/BRAIN W/O DYE: CPT

## 2024-01-06 NOTE — ED PROVIDER NOTES
HPI:  1/6/24,   Time: 2:55 AM JAMARCUS Williamson is a 63 y.o. male presenting to the ED for headache, beginning 1 hour ago.  The complaint has been persistent, mild in severity, and worsened by nothing.  He says he gets headaches like this sometimes when his blood pressure is elevated but he has no significant headache now he took a Tylenol his blood pressure is slightly elevated, he is to recheck to see his he has had no trauma no neck pain no fevers no vomiting no stiff neck no neurological deficits no visual changes no hearing changes he appears in no acute distress his vital signs are stable his neck is supple he has no neurodeficits minimal headache at this time pupils equal round reactive to light if his CT of his head is negative patient will follow-up with the family doctor and return if increased headache fevers vomiting stiff neck is of neck or any new neurodeficits which she has none at this time patient is asymptomatic at this time no abdominal pain no chest pain no shortness of breath    ROS:   Pertinent positives and negatives are stated within HPI, all other systems reviewed and are negative.  --------------------------------------------- PAST HISTORY ---------------------------------------------  Past Medical History:  has a past medical history of Epilepsy (HCC), Essential hypertension, GERD (gastroesophageal reflux disease), Hyperlipidemia LDL goal <100, and Seizures (HCC).    Past Surgical History:  has a past surgical history that includes knee surgery; ECHO Compl W Dop Color Flow (2/8/2013); and fracture surgery.    Social History:  reports that he has never smoked. He has never used smokeless tobacco. He reports that he does not drink alcohol and does not use drugs.    Family History: family history includes Cancer in his maternal grandmother; Diabetes in his father.     The patient’s home medications have been reviewed.    Allergies:  fevers no vomiting no stiff neck no neurological deficits no visual changes no hearing changes he appears in no acute distress his vital signs are stable his neck is supple he has no neurodeficits minimal headache at this time pupils equal round reactive to light if his CT of his head is negative patient will follow-up with the family doctor and return if increased headache fevers vomiting stiff neck is of neck or any new neurodeficits which she has none at this time patient is asymptomatic at this time no abdominal pain no chest pain no shortness of breath    Counseling:   The emergency provider has spoken with the patient and discussed today’s results, in addition to providing specific details for the plan of care and counseling regarding the diagnosis and prognosis.  Questions are answered at this time and they are agreeable with the plan.    Patient was discharged with a copy of his CAT scans were done here please note patient is status post surgery of his eye as well as his brain this is not new the radiologist here read is nothing acute week I was not able to see the old readings but they seem similar to what was done here we were able to go to caregiver only get the reports which patient is comfortable going home he is asymptomatic no neurodeficits I told him to follow-up with [facility] as soon as possible return if any new neurodeficits  --------------------------------- IMPRESSION AND DISPOSITION ---------------------------------    IMPRESSION  1. Nonintractable headache, unspecified chronicity pattern, unspecified headache type    2. Secondary hypertension        DISPOSITION  Disposition: Discharge to home  Patient condition is stable                  Cruz Bellamy MD  01/06/24 0257       Cruz Bellamy MD  01/06/24 0328

## 2024-01-06 NOTE — DISCHARGE INSTRUCTIONS
Return if any increased headache fevers vomiting stiff neck any neurological deficits any chest pain trouble breathing abdominal pain have your blood pressure rechecked soon as possible return if any change in vision

## 2024-08-18 ENCOUNTER — HOSPITAL ENCOUNTER (EMERGENCY)
Age: 64
Discharge: LEFT BEFORE DISCHARGE | End: 2024-08-19
Attending: EMERGENCY MEDICINE
Payer: MEDICARE

## 2024-08-18 DIAGNOSIS — E27.8 ADRENAL NODULE (HCC): ICD-10-CM

## 2024-08-18 DIAGNOSIS — S20.212A CONTUSION OF LEFT CHEST WALL, INITIAL ENCOUNTER: Primary | ICD-10-CM

## 2024-08-18 DIAGNOSIS — S32.048A OTHER CLOSED FRACTURE OF FOURTH LUMBAR VERTEBRA, INITIAL ENCOUNTER (HCC): ICD-10-CM

## 2024-08-18 DIAGNOSIS — S30.0XXA TRAUMATIC HEMATOMA OF LOWER BACK, INITIAL ENCOUNTER: ICD-10-CM

## 2024-08-18 PROCEDURE — 99284 EMERGENCY DEPT VISIT MOD MDM: CPT

## 2024-08-18 ASSESSMENT — PAIN - FUNCTIONAL ASSESSMENT
PAIN_FUNCTIONAL_ASSESSMENT: 0-10
PAIN_FUNCTIONAL_ASSESSMENT: PREVENTS OR INTERFERES SOME ACTIVE ACTIVITIES AND ADLS

## 2024-08-18 ASSESSMENT — PAIN DESCRIPTION - FREQUENCY: FREQUENCY: CONTINUOUS

## 2024-08-18 ASSESSMENT — PAIN SCALES - GENERAL: PAINLEVEL_OUTOF10: 5

## 2024-08-18 ASSESSMENT — LIFESTYLE VARIABLES
HOW MANY STANDARD DRINKS CONTAINING ALCOHOL DO YOU HAVE ON A TYPICAL DAY: PATIENT DOES NOT DRINK
HOW OFTEN DO YOU HAVE A DRINK CONTAINING ALCOHOL: NEVER

## 2024-08-18 ASSESSMENT — PAIN DESCRIPTION - DESCRIPTORS: DESCRIPTORS: ACHING

## 2024-08-18 ASSESSMENT — PAIN DESCRIPTION - ONSET: ONSET: ON-GOING

## 2024-08-18 ASSESSMENT — PAIN DESCRIPTION - ORIENTATION: ORIENTATION: LEFT;POSTERIOR

## 2024-08-18 ASSESSMENT — PAIN DESCRIPTION - PAIN TYPE: TYPE: ACUTE PAIN

## 2024-08-18 ASSESSMENT — PAIN DESCRIPTION - LOCATION: LOCATION: BACK

## 2024-08-19 ENCOUNTER — APPOINTMENT (OUTPATIENT)
Dept: CT IMAGING | Age: 64
End: 2024-08-19
Payer: MEDICARE

## 2024-08-19 ENCOUNTER — APPOINTMENT (OUTPATIENT)
Dept: GENERAL RADIOLOGY | Age: 64
End: 2024-08-19
Payer: MEDICARE

## 2024-08-19 VITALS
SYSTOLIC BLOOD PRESSURE: 169 MMHG | WEIGHT: 210 LBS | OXYGEN SATURATION: 97 % | BODY MASS INDEX: 31.83 KG/M2 | DIASTOLIC BLOOD PRESSURE: 71 MMHG | HEART RATE: 78 BPM | RESPIRATION RATE: 16 BRPM | HEIGHT: 68 IN | TEMPERATURE: 97.8 F

## 2024-08-19 PROBLEM — E27.8 ADRENAL NODULE (HCC): Status: ACTIVE | Noted: 2024-08-19

## 2024-08-19 PROBLEM — S20.212A CONTUSION OF LEFT CHEST WALL: Status: ACTIVE | Noted: 2024-08-19

## 2024-08-19 PROBLEM — S32.049A CLOSED FRACTURE OF FOURTH LUMBAR VERTEBRA (HCC): Status: ACTIVE | Noted: 2024-08-19

## 2024-08-19 PROBLEM — S30.0XXA TRAUMATIC HEMATOMA OF LOWER BACK: Status: ACTIVE | Noted: 2024-08-19

## 2024-08-19 PROBLEM — E27.9 ADRENAL NODULE (HCC): Status: ACTIVE | Noted: 2024-08-19

## 2024-08-19 LAB
BILIRUB UR QL STRIP: NEGATIVE
CLARITY UR: CLEAR
COLOR UR: YELLOW
GLUCOSE UR STRIP-MCNC: NEGATIVE MG/DL
HGB UR QL STRIP.AUTO: NEGATIVE
KETONES UR STRIP-MCNC: NEGATIVE MG/DL
LEUKOCYTE ESTERASE UR QL STRIP: NEGATIVE
NITRITE UR QL STRIP: NEGATIVE
PH UR STRIP: 5.5 [PH] (ref 5–9)
PROT UR STRIP-MCNC: NEGATIVE MG/DL
RBC #/AREA URNS HPF: ABNORMAL /HPF
SP GR UR STRIP: >1.03 (ref 1–1.03)
UROBILINOGEN UR STRIP-ACNC: 0.2 EU/DL (ref 0–1)
WBC #/AREA URNS HPF: ABNORMAL /HPF

## 2024-08-19 PROCEDURE — 73030 X-RAY EXAM OF SHOULDER: CPT

## 2024-08-19 PROCEDURE — 6370000000 HC RX 637 (ALT 250 FOR IP): Performed by: EMERGENCY MEDICINE

## 2024-08-19 PROCEDURE — 71250 CT THORAX DX C-: CPT

## 2024-08-19 PROCEDURE — 81001 URINALYSIS AUTO W/SCOPE: CPT

## 2024-08-19 PROCEDURE — 74176 CT ABD & PELVIS W/O CONTRAST: CPT

## 2024-08-19 RX ORDER — IBUPROFEN 800 MG/1
800 TABLET ORAL ONCE
Status: COMPLETED | OUTPATIENT
Start: 2024-08-19 | End: 2024-08-19

## 2024-08-19 RX ORDER — OXYCODONE HYDROCHLORIDE AND ACETAMINOPHEN 5; 325 MG/1; MG/1
1 TABLET ORAL EVERY 6 HOURS PRN
Qty: 12 TABLET | Refills: 0 | Status: SHIPPED | OUTPATIENT
Start: 2024-08-19 | End: 2024-08-26

## 2024-08-19 RX ORDER — OXYCODONE HYDROCHLORIDE AND ACETAMINOPHEN 5; 325 MG/1; MG/1
1 TABLET ORAL ONCE
Status: COMPLETED | OUTPATIENT
Start: 2024-08-19 | End: 2024-08-19

## 2024-08-19 RX ADMIN — OXYCODONE HYDROCHLORIDE AND ACETAMINOPHEN 1 TABLET: 5; 325 TABLET ORAL at 00:23

## 2024-08-19 RX ADMIN — IBUPROFEN 800 MG: 800 TABLET, FILM COATED ORAL at 00:21

## 2024-08-19 ASSESSMENT — PAIN - FUNCTIONAL ASSESSMENT: PAIN_FUNCTIONAL_ASSESSMENT: PREVENTS OR INTERFERES WITH MANY ACTIVE NOT PASSIVE ACTIVITIES

## 2024-08-19 ASSESSMENT — PAIN DESCRIPTION - ORIENTATION: ORIENTATION: LEFT

## 2024-08-19 ASSESSMENT — PAIN DESCRIPTION - LOCATION: LOCATION: SHOULDER

## 2024-08-19 ASSESSMENT — PAIN SCALES - GENERAL: PAINLEVEL_OUTOF10: 5

## 2024-08-19 ASSESSMENT — PAIN DESCRIPTION - DESCRIPTORS: DESCRIPTORS: DISCOMFORT

## 2024-08-19 NOTE — ED PROVIDER NOTES
NEGATIVE mg/dL    Specific Gravity, UA >1.030 (H) 1.005 - 1.030    Urine Hgb NEGATIVE NEGATIVE    pH, Urine 5.5 5.0 - 9.0    Protein, UA NEGATIVE NEGATIVE mg/dL    Urobilinogen, Urine 0.2 0.0 - 1.0 EU/dL    Nitrite, Urine NEGATIVE NEGATIVE    Leukocyte Esterase, Urine NEGATIVE NEGATIVE    WBC, UA 0 TO 5 0 TO 5 /HPF    RBC, UA 0 TO 2 0 TO 2 /HPF       RADIOLOGY:  Interpreted by Radiologist.  CT CHEST WO CONTRAST   Final Result   1. Right L4 transverse process fracture.  Adjacent soft tissue contusion with   2.5 cm hematoma suggested.   2. No acute injury of the chest is identified.   3. Left adrenal nodule, probable adenoma. Recommend follow-up adrenal washout   CT in 1 year. If stable for greater than or equal to 1 year, no further   follow-up imaging.   4. Other chronic findings as described.         CT ABDOMEN PELVIS WO CONTRAST Additional Contrast? None   Final Result   1. Right L4 transverse process fracture.  Adjacent soft tissue contusion with   2.5 cm hematoma suggested.   2. No acute injury of the chest is identified.   3. Left adrenal nodule, probable adenoma. Recommend follow-up adrenal washout   CT in 1 year. If stable for greater than or equal to 1 year, no further   follow-up imaging.   4. Other chronic findings as described.         XR SHOULDER LEFT (MIN 2 VIEWS)   Final Result   No fracture or dislocation.             ------------------------- NURSING NOTES AND VITALS REVIEWED ---------------------------   The nursing notes within the ED encounter and vital signs as below have been reviewed.   BP (!) 169/71   Pulse 78   Temp 97.8 °F (36.6 °C) (Oral)   Resp 16   Ht 1.727 m (5' 8\")   Wt 95.3 kg (210 lb)   SpO2 97%   BMI 31.93 kg/m²   Oxygen Saturation Interpretation: Normal      ---------------------------------------------------PHYSICAL EXAM--------------------------------------      Constitutional/General: Alert and oriented x3, well appearing, non toxic in NAD  Head: NC/AT  Eyes: PERRL,

## 2024-08-19 NOTE — DISCHARGE INSTRUCTIONS
You need to get an MRI of your back is soon as possible return if fevers vomiting any neurological deficits or any other symptoms you need to follow-up with spine surgery but get the MRI first and return immediately for any new deficits or problems with urinating or bowel movements or any new numbness of your arms or legs or numbness of your groin

## 2024-12-13 ENCOUNTER — HOSPITAL ENCOUNTER (OUTPATIENT)
Age: 64
Discharge: HOME | End: 2024-12-13
Payer: MEDICARE

## 2024-12-13 PROCEDURE — 36415 COLL VENOUS BLD VENIPUNCTURE: CPT

## 2024-12-13 PROCEDURE — 80177 DRUG SCRN QUAN LEVETIRACETAM: CPT

## 2024-12-13 PROCEDURE — 80186 ASSAY OF PHENYTOIN FREE: CPT

## 2024-12-15 LAB
LEVETIRACETAM SERPL-MCNC: 16 UG/ML
PHENYTOIN FREE SERPL-MCNC: 1.1 UG/ML (ref 1–2)

## 2024-12-31 ENCOUNTER — APPOINTMENT (OUTPATIENT)
Dept: GENERAL RADIOLOGY | Age: 64
DRG: 101 | End: 2024-12-31
Payer: MEDICARE

## 2024-12-31 ENCOUNTER — APPOINTMENT (OUTPATIENT)
Dept: CT IMAGING | Age: 64
DRG: 101 | End: 2024-12-31
Payer: MEDICARE

## 2024-12-31 ENCOUNTER — HOSPITAL ENCOUNTER (INPATIENT)
Age: 64
LOS: 8 days | Discharge: NURSING, REHAB OR LONG-TERM CARE | DRG: 101 | End: 2025-01-08
Attending: STUDENT IN AN ORGANIZED HEALTH CARE EDUCATION/TRAINING PROGRAM | Admitting: INTERNAL MEDICINE
Payer: MEDICARE

## 2024-12-31 DIAGNOSIS — T50.905A ADVERSE EFFECT OF DRUG, INITIAL ENCOUNTER: ICD-10-CM

## 2024-12-31 DIAGNOSIS — R56.9 SEIZURE (HCC): Primary | ICD-10-CM

## 2024-12-31 PROBLEM — R62.7 FAILURE TO THRIVE IN ADULT: Status: ACTIVE | Noted: 2024-12-31

## 2024-12-31 LAB
ALBUMIN SERPL-MCNC: 4.2 G/DL (ref 3.5–5.2)
ALP SERPL-CCNC: 98 U/L (ref 40–129)
ALT SERPL-CCNC: 36 U/L (ref 0–40)
ANION GAP SERPL CALCULATED.3IONS-SCNC: 13 MMOL/L (ref 7–16)
AST SERPL-CCNC: 22 U/L (ref 0–39)
BASOPHILS # BLD: 0.03 K/UL (ref 0–0.2)
BASOPHILS NFR BLD: 0 % (ref 0–2)
BILIRUB SERPL-MCNC: 0.4 MG/DL (ref 0–1.2)
BILIRUB UR QL STRIP: NEGATIVE
BUN SERPL-MCNC: 25 MG/DL (ref 6–23)
CALCIUM SERPL-MCNC: 9.4 MG/DL (ref 8.6–10.2)
CHLORIDE SERPL-SCNC: 100 MMOL/L (ref 98–107)
CLARITY UR: CLEAR
CO2 SERPL-SCNC: 24 MMOL/L (ref 22–29)
COLOR UR: YELLOW
CREAT SERPL-MCNC: 0.8 MG/DL (ref 0.7–1.2)
EKG ATRIAL RATE: 59 BPM
EKG P AXIS: 65 DEGREES
EKG P-R INTERVAL: 156 MS
EKG Q-T INTERVAL: 442 MS
EKG QRS DURATION: 94 MS
EKG QTC CALCULATION (BAZETT): 437 MS
EKG R AXIS: 22 DEGREES
EKG T AXIS: 33 DEGREES
EKG VENTRICULAR RATE: 59 BPM
EOSINOPHIL # BLD: 0.05 K/UL (ref 0.05–0.5)
EOSINOPHILS RELATIVE PERCENT: 1 % (ref 0–6)
ERYTHROCYTE [DISTWIDTH] IN BLOOD BY AUTOMATED COUNT: 13.2 % (ref 11.5–15)
GFR, ESTIMATED: >90 ML/MIN/1.73M2
GLUCOSE SERPL-MCNC: 125 MG/DL (ref 74–99)
GLUCOSE UR STRIP-MCNC: NEGATIVE MG/DL
HCT VFR BLD AUTO: 43.4 % (ref 37–54)
HGB BLD-MCNC: 14.9 G/DL (ref 12.5–16.5)
HGB UR QL STRIP.AUTO: NEGATIVE
IMM GRANULOCYTES # BLD AUTO: <0.03 K/UL (ref 0–0.58)
IMM GRANULOCYTES NFR BLD: 0 % (ref 0–5)
KETONES UR STRIP-MCNC: NEGATIVE MG/DL
LEUKOCYTE ESTERASE UR QL STRIP: NEGATIVE
LYMPHOCYTES NFR BLD: 1.34 K/UL (ref 1.5–4)
LYMPHOCYTES RELATIVE PERCENT: 18 % (ref 20–42)
MCH RBC QN AUTO: 31.7 PG (ref 26–35)
MCHC RBC AUTO-ENTMCNC: 34.3 G/DL (ref 32–34.5)
MCV RBC AUTO: 92.3 FL (ref 80–99.9)
MONOCYTES NFR BLD: 0.79 K/UL (ref 0.1–0.95)
MONOCYTES NFR BLD: 11 % (ref 2–12)
NEUTROPHILS NFR BLD: 69 % (ref 43–80)
NEUTS SEG NFR BLD: 5.05 K/UL (ref 1.8–7.3)
NITRITE UR QL STRIP: NEGATIVE
PH UR STRIP: 5.5 [PH] (ref 5–9)
PLATELET # BLD AUTO: 275 K/UL (ref 130–450)
PMV BLD AUTO: 10.5 FL (ref 7–12)
POTASSIUM SERPL-SCNC: 3.8 MMOL/L (ref 3.5–5)
PROT SERPL-MCNC: 8.2 G/DL (ref 6.4–8.3)
PROT UR STRIP-MCNC: NEGATIVE MG/DL
RBC # BLD AUTO: 4.7 M/UL (ref 3.8–5.8)
RBC #/AREA URNS HPF: ABNORMAL /HPF
SODIUM SERPL-SCNC: 137 MMOL/L (ref 132–146)
SP GR UR STRIP: >1.03 (ref 1–1.03)
TROPONIN I SERPL HS-MCNC: 11 NG/L (ref 0–11)
TROPONIN I SERPL HS-MCNC: 14 NG/L (ref 0–11)
UROBILINOGEN UR STRIP-ACNC: 0.2 EU/DL (ref 0–1)
WBC #/AREA URNS HPF: ABNORMAL /HPF
WBC OTHER # BLD: 7.3 K/UL (ref 4.5–11.5)

## 2024-12-31 PROCEDURE — 99285 EMERGENCY DEPT VISIT HI MDM: CPT

## 2024-12-31 PROCEDURE — 6370000000 HC RX 637 (ALT 250 FOR IP): Performed by: INTERNAL MEDICINE

## 2024-12-31 PROCEDURE — 81001 URINALYSIS AUTO W/SCOPE: CPT

## 2024-12-31 PROCEDURE — 84484 ASSAY OF TROPONIN QUANT: CPT

## 2024-12-31 PROCEDURE — 1200000000 HC SEMI PRIVATE

## 2024-12-31 PROCEDURE — 2500000003 HC RX 250 WO HCPCS: Performed by: INTERNAL MEDICINE

## 2024-12-31 PROCEDURE — 85025 COMPLETE CBC W/AUTO DIFF WBC: CPT

## 2024-12-31 PROCEDURE — 70450 CT HEAD/BRAIN W/O DYE: CPT

## 2024-12-31 PROCEDURE — 93010 ELECTROCARDIOGRAM REPORT: CPT | Performed by: INTERNAL MEDICINE

## 2024-12-31 PROCEDURE — 93005 ELECTROCARDIOGRAM TRACING: CPT | Performed by: STUDENT IN AN ORGANIZED HEALTH CARE EDUCATION/TRAINING PROGRAM

## 2024-12-31 PROCEDURE — 71045 X-RAY EXAM CHEST 1 VIEW: CPT

## 2024-12-31 PROCEDURE — 6360000002 HC RX W HCPCS: Performed by: INTERNAL MEDICINE

## 2024-12-31 PROCEDURE — 36415 COLL VENOUS BLD VENIPUNCTURE: CPT

## 2024-12-31 PROCEDURE — 80186 ASSAY OF PHENYTOIN FREE: CPT

## 2024-12-31 PROCEDURE — 80053 COMPREHEN METABOLIC PANEL: CPT

## 2024-12-31 PROCEDURE — 80177 DRUG SCRN QUAN LEVETIRACETAM: CPT

## 2024-12-31 RX ORDER — ATORVASTATIN CALCIUM 20 MG/1
20 TABLET, FILM COATED ORAL DAILY
COMMUNITY

## 2024-12-31 RX ORDER — TRIAMTERENE AND HYDROCHLOROTHIAZIDE 37.5; 25 MG/1; MG/1
1 CAPSULE ORAL DAILY
Status: DISCONTINUED | OUTPATIENT
Start: 2024-12-31 | End: 2024-12-31

## 2024-12-31 RX ORDER — ENOXAPARIN SODIUM 100 MG/ML
40 INJECTION SUBCUTANEOUS DAILY
Status: DISCONTINUED | OUTPATIENT
Start: 2024-12-31 | End: 2025-01-08 | Stop reason: HOSPADM

## 2024-12-31 RX ORDER — LISINOPRIL 5 MG/1
5 TABLET ORAL DAILY
Status: DISCONTINUED | OUTPATIENT
Start: 2024-12-31 | End: 2024-12-31

## 2024-12-31 RX ORDER — SODIUM CHLORIDE 0.9 % (FLUSH) 0.9 %
5-40 SYRINGE (ML) INJECTION PRN
Status: DISCONTINUED | OUTPATIENT
Start: 2024-12-31 | End: 2025-01-08 | Stop reason: HOSPADM

## 2024-12-31 RX ORDER — LACOSAMIDE 150 MG/1
150 TABLET ORAL 2 TIMES DAILY
Status: ON HOLD | COMMUNITY
End: 2025-01-07

## 2024-12-31 RX ORDER — PHENYTOIN SODIUM 100 MG/1
100 CAPSULE, EXTENDED RELEASE ORAL 2 TIMES DAILY
Status: DISCONTINUED | OUTPATIENT
Start: 2024-12-31 | End: 2024-12-31

## 2024-12-31 RX ORDER — ONDANSETRON 4 MG/1
4 TABLET, ORALLY DISINTEGRATING ORAL EVERY 8 HOURS PRN
Status: DISCONTINUED | OUTPATIENT
Start: 2024-12-31 | End: 2025-01-08 | Stop reason: HOSPADM

## 2024-12-31 RX ORDER — LEVETIRACETAM 750 MG/1
1500 TABLET, FILM COATED, EXTENDED RELEASE ORAL 2 TIMES DAILY
COMMUNITY

## 2024-12-31 RX ORDER — CARVEDILOL 6.25 MG/1
12.5 TABLET ORAL 2 TIMES DAILY WITH MEALS
Status: DISCONTINUED | OUTPATIENT
Start: 2024-12-31 | End: 2025-01-08 | Stop reason: HOSPADM

## 2024-12-31 RX ORDER — AMLODIPINE BESYLATE 10 MG/1
10 TABLET ORAL DAILY
Status: DISCONTINUED | OUTPATIENT
Start: 2024-12-31 | End: 2025-01-08 | Stop reason: HOSPADM

## 2024-12-31 RX ORDER — AMLODIPINE BESYLATE 5 MG/1
5 TABLET ORAL DAILY
Status: DISCONTINUED | OUTPATIENT
Start: 2024-12-31 | End: 2024-12-31 | Stop reason: SDUPTHER

## 2024-12-31 RX ORDER — ACETAMINOPHEN 650 MG/1
650 SUPPOSITORY RECTAL EVERY 6 HOURS PRN
Status: DISCONTINUED | OUTPATIENT
Start: 2024-12-31 | End: 2025-01-08 | Stop reason: HOSPADM

## 2024-12-31 RX ORDER — CHOLECALCIFEROL (VITAMIN D3) 50 MCG
2000 TABLET ORAL DAILY
Status: DISCONTINUED | OUTPATIENT
Start: 2024-12-31 | End: 2025-01-08 | Stop reason: HOSPADM

## 2024-12-31 RX ORDER — MAGNESIUM SULFATE IN WATER 40 MG/ML
2000 INJECTION, SOLUTION INTRAVENOUS PRN
Status: DISCONTINUED | OUTPATIENT
Start: 2024-12-31 | End: 2025-01-08 | Stop reason: HOSPADM

## 2024-12-31 RX ORDER — POTASSIUM CHLORIDE 7.45 MG/ML
10 INJECTION INTRAVENOUS PRN
Status: DISCONTINUED | OUTPATIENT
Start: 2024-12-31 | End: 2025-01-08 | Stop reason: HOSPADM

## 2024-12-31 RX ORDER — POLYETHYLENE GLYCOL 3350 17 G/17G
17 POWDER, FOR SOLUTION ORAL DAILY PRN
Status: DISCONTINUED | OUTPATIENT
Start: 2024-12-31 | End: 2025-01-08 | Stop reason: HOSPADM

## 2024-12-31 RX ORDER — ONDANSETRON 2 MG/ML
4 INJECTION INTRAMUSCULAR; INTRAVENOUS EVERY 6 HOURS PRN
Status: DISCONTINUED | OUTPATIENT
Start: 2024-12-31 | End: 2025-01-08 | Stop reason: HOSPADM

## 2024-12-31 RX ORDER — LACOSAMIDE 100 MG/1
100 TABLET ORAL 2 TIMES DAILY
Status: COMPLETED | OUTPATIENT
Start: 2024-12-31 | End: 2024-12-31

## 2024-12-31 RX ORDER — MIRTAZAPINE 15 MG/1
30 TABLET, FILM COATED ORAL NIGHTLY
Status: DISCONTINUED | OUTPATIENT
Start: 2024-12-31 | End: 2024-12-31

## 2024-12-31 RX ORDER — SODIUM CHLORIDE 0.9 % (FLUSH) 0.9 %
5-40 SYRINGE (ML) INJECTION EVERY 12 HOURS SCHEDULED
Status: DISCONTINUED | OUTPATIENT
Start: 2024-12-31 | End: 2025-01-08 | Stop reason: HOSPADM

## 2024-12-31 RX ORDER — POTASSIUM CHLORIDE 1500 MG/1
40 TABLET, EXTENDED RELEASE ORAL PRN
Status: DISCONTINUED | OUTPATIENT
Start: 2024-12-31 | End: 2025-01-08 | Stop reason: HOSPADM

## 2024-12-31 RX ORDER — HYDROCHLOROTHIAZIDE 25 MG/1
25 TABLET ORAL DAILY
Status: DISCONTINUED | OUTPATIENT
Start: 2024-12-31 | End: 2025-01-08

## 2024-12-31 RX ORDER — LEVETIRACETAM 750 MG/1
1500 TABLET, FILM COATED, EXTENDED RELEASE ORAL 2 TIMES DAILY
Status: DISCONTINUED | OUTPATIENT
Start: 2024-12-31 | End: 2025-01-08 | Stop reason: HOSPADM

## 2024-12-31 RX ORDER — VALSARTAN 320 MG/1
320 TABLET ORAL DAILY
Status: DISCONTINUED | OUTPATIENT
Start: 2024-12-31 | End: 2025-01-08 | Stop reason: HOSPADM

## 2024-12-31 RX ORDER — ACETAMINOPHEN 325 MG/1
650 TABLET ORAL EVERY 6 HOURS PRN
Status: DISCONTINUED | OUTPATIENT
Start: 2024-12-31 | End: 2025-01-08 | Stop reason: HOSPADM

## 2024-12-31 RX ORDER — ATORVASTATIN CALCIUM 20 MG/1
20 TABLET, FILM COATED ORAL NIGHTLY
Status: DISCONTINUED | OUTPATIENT
Start: 2024-12-31 | End: 2025-01-08 | Stop reason: HOSPADM

## 2024-12-31 RX ORDER — CARVEDILOL 12.5 MG/1
12.5 TABLET ORAL 2 TIMES DAILY WITH MEALS
COMMUNITY

## 2024-12-31 RX ORDER — AMLODIPINE BESYLATE 5 MG/1
5 TABLET ORAL DAILY
Status: DISCONTINUED | OUTPATIENT
Start: 2024-12-31 | End: 2024-12-31

## 2024-12-31 RX ORDER — SODIUM CHLORIDE 9 MG/ML
INJECTION, SOLUTION INTRAVENOUS PRN
Status: DISCONTINUED | OUTPATIENT
Start: 2024-12-31 | End: 2025-01-08 | Stop reason: HOSPADM

## 2024-12-31 RX ORDER — LEVETIRACETAM 750 MG/1
750 TABLET, FILM COATED, EXTENDED RELEASE ORAL 2 TIMES DAILY
Status: DISCONTINUED | OUTPATIENT
Start: 2024-12-31 | End: 2024-12-31

## 2024-12-31 RX ORDER — AMLODIPINE BESYLATE VALSARTAN HYDROCHLOROTHIAZIDE 10; 25; 320 MG/1; MG/1; MG/1
1 TABLET, FILM COATED ORAL DAILY
Status: ON HOLD | COMMUNITY
End: 2025-01-08 | Stop reason: HOSPADM

## 2024-12-31 RX ORDER — LEVETIRACETAM 500 MG/1
1000 TABLET ORAL 2 TIMES DAILY
Status: DISCONTINUED | OUTPATIENT
Start: 2024-12-31 | End: 2024-12-31

## 2024-12-31 RX ADMIN — Medication 2000 UNITS: at 18:13

## 2024-12-31 RX ADMIN — HYDROCHLOROTHIAZIDE 25 MG: 25 TABLET ORAL at 18:13

## 2024-12-31 RX ADMIN — AMLODIPINE BESYLATE 10 MG: 10 TABLET ORAL at 18:13

## 2024-12-31 RX ADMIN — LACOSAMIDE 100 MG: 100 TABLET, FILM COATED ORAL at 20:07

## 2024-12-31 RX ADMIN — LEVETIRACETAM 1500 MG: 750 TABLET, FILM COATED, EXTENDED RELEASE ORAL at 20:08

## 2024-12-31 RX ADMIN — SODIUM CHLORIDE, PRESERVATIVE FREE 10 ML: 5 INJECTION INTRAVENOUS at 20:08

## 2024-12-31 RX ADMIN — ENOXAPARIN SODIUM 40 MG: 100 INJECTION SUBCUTANEOUS at 18:13

## 2024-12-31 RX ADMIN — CARVEDILOL 12.5 MG: 6.25 TABLET, FILM COATED ORAL at 18:13

## 2024-12-31 RX ADMIN — ATORVASTATIN CALCIUM 20 MG: 20 TABLET, FILM COATED ORAL at 20:07

## 2024-12-31 NOTE — ED PROVIDER NOTES
[facility] EMERGENCY DEPARTMENT  EMERGENCY DEPARTMENT ENCOUNTER    Pt Name: Truman Williamson  MRN: [id number]  Birthdate 1960  Date of evaluation: 12/31/2024  Provider: Gabe Palafox MD  PCP: Jacob Steele MD  Note Started: 12:05 PM EST 12/31/24    HPI     Patient is a 64 y.o. male presents with a chief complaint of   Chief Complaint   Patient presents with    Medication Reaction     Sent by Dr. Steele. Patient medication got changed from Dilantin to Vimpat now patient has increased fatigue and weakness after taking more medications.     Seizures     Family and caregiver report that patient is having seizures but patient states he is not having seizures.        Patient presents with his brother.  Patient reportedly was just admitted to the [facility] and had some medication changes.  Patient denies any chest pain or shortness of breath.  Denies any fevers or chills.  No nausea or vomiting.  Patient states that he currently feels fine.  Patient stated that he feels fine.  Patient states that he is just feeling fatigued and this is for about an hour after he takes his blood pressure medication that he takes at 8:00 in the morning.  Patient denies any changes in urinary or bowel habits.  Patient is adamant that he is not having any seizures since he has been having his medication change.  She states that he feels fatigued but states this does not occur when he takes his seizure meds this is only when he takes his blood pressure medication.  Patient states that he was post to go to his primary care appointment today but did not go.  This was because he was 2 weeks ago was right after he took his blood pressure meds.  Patient stated that he had this problem a few months ago but stopped taking    Nursing Notes were all reviewed and agreed with or any disagreements were addressed in the HPI.    History From: Patient and patient's brother.    Review of Systems

## 2025-01-01 LAB
ANION GAP SERPL CALCULATED.3IONS-SCNC: 12 MMOL/L (ref 7–16)
BASOPHILS # BLD: 0.05 K/UL (ref 0–0.2)
BASOPHILS NFR BLD: 1 % (ref 0–2)
BUN SERPL-MCNC: 20 MG/DL (ref 6–23)
CALCIUM SERPL-MCNC: 8.8 MG/DL (ref 8.6–10.2)
CHLORIDE SERPL-SCNC: 102 MMOL/L (ref 98–107)
CO2 SERPL-SCNC: 24 MMOL/L (ref 22–29)
CREAT SERPL-MCNC: 0.8 MG/DL (ref 0.7–1.2)
EOSINOPHIL # BLD: 0.07 K/UL (ref 0.05–0.5)
EOSINOPHILS RELATIVE PERCENT: 1 % (ref 0–6)
ERYTHROCYTE [DISTWIDTH] IN BLOOD BY AUTOMATED COUNT: 13.3 % (ref 11.5–15)
GFR, ESTIMATED: >90 ML/MIN/1.73M2
GLUCOSE SERPL-MCNC: 118 MG/DL (ref 74–99)
HCT VFR BLD AUTO: 38.9 % (ref 37–54)
HGB BLD-MCNC: 13.4 G/DL (ref 12.5–16.5)
IMM GRANULOCYTES # BLD AUTO: <0.03 K/UL (ref 0–0.58)
IMM GRANULOCYTES NFR BLD: 0 % (ref 0–5)
LYMPHOCYTES NFR BLD: 3.11 K/UL (ref 1.5–4)
LYMPHOCYTES RELATIVE PERCENT: 38 % (ref 20–42)
MAGNESIUM SERPL-MCNC: 2.1 MG/DL (ref 1.6–2.6)
MCH RBC QN AUTO: 31.2 PG (ref 26–35)
MCHC RBC AUTO-ENTMCNC: 34.4 G/DL (ref 32–34.5)
MCV RBC AUTO: 90.5 FL (ref 80–99.9)
MONOCYTES NFR BLD: 1.02 K/UL (ref 0.1–0.95)
MONOCYTES NFR BLD: 13 % (ref 2–12)
NEUTROPHILS NFR BLD: 47 % (ref 43–80)
NEUTS SEG NFR BLD: 3.84 K/UL (ref 1.8–7.3)
PLATELET # BLD AUTO: 236 K/UL (ref 130–450)
PMV BLD AUTO: 10.2 FL (ref 7–12)
POTASSIUM SERPL-SCNC: 3.3 MMOL/L (ref 3.5–5)
RBC # BLD AUTO: 4.3 M/UL (ref 3.8–5.8)
SODIUM SERPL-SCNC: 138 MMOL/L (ref 132–146)
WBC OTHER # BLD: 8.1 K/UL (ref 4.5–11.5)

## 2025-01-01 PROCEDURE — 97161 PT EVAL LOW COMPLEX 20 MIN: CPT

## 2025-01-01 PROCEDURE — 83735 ASSAY OF MAGNESIUM: CPT

## 2025-01-01 PROCEDURE — 36415 COLL VENOUS BLD VENIPUNCTURE: CPT

## 2025-01-01 PROCEDURE — 85025 COMPLETE CBC W/AUTO DIFF WBC: CPT

## 2025-01-01 PROCEDURE — 2500000003 HC RX 250 WO HCPCS: Performed by: INTERNAL MEDICINE

## 2025-01-01 PROCEDURE — 6370000000 HC RX 637 (ALT 250 FOR IP): Performed by: INTERNAL MEDICINE

## 2025-01-01 PROCEDURE — 6360000002 HC RX W HCPCS: Performed by: INTERNAL MEDICINE

## 2025-01-01 PROCEDURE — 1200000000 HC SEMI PRIVATE

## 2025-01-01 PROCEDURE — 80048 BASIC METABOLIC PNL TOTAL CA: CPT

## 2025-01-01 RX ADMIN — AMLODIPINE BESYLATE 10 MG: 10 TABLET ORAL at 09:00

## 2025-01-01 RX ADMIN — SODIUM CHLORIDE, PRESERVATIVE FREE 10 ML: 5 INJECTION INTRAVENOUS at 09:08

## 2025-01-01 RX ADMIN — POTASSIUM CHLORIDE 40 MEQ: 1500 TABLET, EXTENDED RELEASE ORAL at 20:27

## 2025-01-01 RX ADMIN — ENOXAPARIN SODIUM 40 MG: 100 INJECTION SUBCUTANEOUS at 09:00

## 2025-01-01 RX ADMIN — ATORVASTATIN CALCIUM 20 MG: 20 TABLET, FILM COATED ORAL at 20:28

## 2025-01-01 RX ADMIN — HYDROCHLOROTHIAZIDE 25 MG: 25 TABLET ORAL at 09:00

## 2025-01-01 RX ADMIN — CARVEDILOL 12.5 MG: 6.25 TABLET, FILM COATED ORAL at 16:39

## 2025-01-01 RX ADMIN — CARVEDILOL 12.5 MG: 6.25 TABLET, FILM COATED ORAL at 08:59

## 2025-01-01 RX ADMIN — LACOSAMIDE 150 MG: 100 TABLET, FILM COATED ORAL at 20:28

## 2025-01-01 RX ADMIN — Medication 2000 UNITS: at 09:00

## 2025-01-01 RX ADMIN — VALSARTAN 320 MG: 320 TABLET ORAL at 09:06

## 2025-01-01 RX ADMIN — LEVETIRACETAM 1500 MG: 750 TABLET, FILM COATED, EXTENDED RELEASE ORAL at 09:07

## 2025-01-01 RX ADMIN — SODIUM CHLORIDE, PRESERVATIVE FREE 10 ML: 5 INJECTION INTRAVENOUS at 20:33

## 2025-01-01 RX ADMIN — LEVETIRACETAM 1500 MG: 750 TABLET, FILM COATED, EXTENDED RELEASE ORAL at 20:29

## 2025-01-01 RX ADMIN — LACOSAMIDE 150 MG: 100 TABLET, FILM COATED ORAL at 09:00

## 2025-01-01 NOTE — PLAN OF CARE
Problem: Safety - Adult  Goal: Free from fall injury  1/1/2025 0108 by Sandro Shin RN  Outcome: Progressing  1/1/2025 0108 by Sandro Shin, RN  Outcome: Progressing

## 2025-01-01 NOTE — H&P
Internal Medicine History & Physical     Name: Truman Williamson  : 1960  Chief Complaint: Medication Reaction (Sent by Dr. Steele. Patient medication got changed from Dilantin to Vimpat now patient has increased fatigue and weakness after taking more medications. ) and Seizures (Family and caregiver report that patient is having seizures but patient states he is not having seizures. )  Primary Care Physician: Jacob Steele MD  Admission date: 2024  Date of service: 2025     History of Present Illness  Truman is a 64 y.o. year old male with a PMH as below who presented with a chief complaint of  inability to care for himself, urine incontinence, not taking meds. Was recently hospitalized at [facility] seizure monitoring unit. I spoke to their  last week, and I was told that \"he did not qualify to go to [facility]\" ([facility] too good) I believe he needs long-term placement as he poses a risk to himself. Family and caregiver are in agreement.         In the ED data all unremarkable.     The patient was admitted for failure to thrive in adult.             Past Medical History:   Diagnosis Date    Epilepsy (HCC)     Essential hypertension 10/11/2015    GERD (gastroesophageal reflux disease)     Hyperlipidemia LDL goal <100 10/11/2015    Seizures (HCC)        Past Surgical History:   Procedure Laterality Date    ECHO COMPL W DOP COLOR FLOW  2013         FRACTURE SURGERY      KNEE SURGERY      left knee       Family History   Problem Relation Age of Onset    Diabetes Father     Cancer Maternal Grandmother          Social History  Patient lives at home, helped by caregiver-Anna. .   At baseline patient ambulates with no aids  Illicit drugs: Denies   TOBACCO:   reports that he has never smoked. He has never used smokeless tobacco.  ETOH:   reports no history of alcohol use.    Home Medications  Prior to Admission medications    Medication Sig Start Date End Date Taking? Authorizing Provider   lacosamide

## 2025-01-02 PROBLEM — R39.81 FUNCTIONAL URINARY INCONTINENCE: Status: ACTIVE | Noted: 2025-01-02

## 2025-01-02 LAB
ANION GAP SERPL CALCULATED.3IONS-SCNC: 12 MMOL/L (ref 7–16)
BASOPHILS # BLD: 0.04 K/UL (ref 0–0.2)
BASOPHILS NFR BLD: 1 % (ref 0–2)
BUN SERPL-MCNC: 24 MG/DL (ref 6–23)
CALCIUM SERPL-MCNC: 9 MG/DL (ref 8.6–10.2)
CHLORIDE SERPL-SCNC: 101 MMOL/L (ref 98–107)
CO2 SERPL-SCNC: 23 MMOL/L (ref 22–29)
CREAT SERPL-MCNC: 0.8 MG/DL (ref 0.7–1.2)
EOSINOPHIL # BLD: 0.1 K/UL (ref 0.05–0.5)
EOSINOPHILS RELATIVE PERCENT: 1 % (ref 0–6)
ERYTHROCYTE [DISTWIDTH] IN BLOOD BY AUTOMATED COUNT: 13.1 % (ref 11.5–15)
GFR, ESTIMATED: >90 ML/MIN/1.73M2
GLUCOSE SERPL-MCNC: 118 MG/DL (ref 74–99)
HCT VFR BLD AUTO: 39.1 % (ref 37–54)
HGB BLD-MCNC: 13.4 G/DL (ref 12.5–16.5)
IMM GRANULOCYTES # BLD AUTO: <0.03 K/UL (ref 0–0.58)
IMM GRANULOCYTES NFR BLD: 0 % (ref 0–5)
LYMPHOCYTES NFR BLD: 2.19 K/UL (ref 1.5–4)
LYMPHOCYTES RELATIVE PERCENT: 30 % (ref 20–42)
MAGNESIUM SERPL-MCNC: 2.4 MG/DL (ref 1.6–2.6)
MCH RBC QN AUTO: 31.3 PG (ref 26–35)
MCHC RBC AUTO-ENTMCNC: 34.3 G/DL (ref 32–34.5)
MCV RBC AUTO: 91.4 FL (ref 80–99.9)
MONOCYTES NFR BLD: 1.08 K/UL (ref 0.1–0.95)
MONOCYTES NFR BLD: 15 % (ref 2–12)
NEUTROPHILS NFR BLD: 54 % (ref 43–80)
NEUTS SEG NFR BLD: 3.96 K/UL (ref 1.8–7.3)
PLATELET # BLD AUTO: 252 K/UL (ref 130–450)
PMV BLD AUTO: 10.4 FL (ref 7–12)
POTASSIUM SERPL-SCNC: 3.5 MMOL/L (ref 3.5–5)
RBC # BLD AUTO: 4.28 M/UL (ref 3.8–5.8)
SODIUM SERPL-SCNC: 136 MMOL/L (ref 132–146)
WBC OTHER # BLD: 7.4 K/UL (ref 4.5–11.5)

## 2025-01-02 PROCEDURE — 6370000000 HC RX 637 (ALT 250 FOR IP): Performed by: INTERNAL MEDICINE

## 2025-01-02 PROCEDURE — 97165 OT EVAL LOW COMPLEX 30 MIN: CPT

## 2025-01-02 PROCEDURE — 6360000002 HC RX W HCPCS: Performed by: INTERNAL MEDICINE

## 2025-01-02 PROCEDURE — 80048 BASIC METABOLIC PNL TOTAL CA: CPT

## 2025-01-02 PROCEDURE — 36415 COLL VENOUS BLD VENIPUNCTURE: CPT

## 2025-01-02 PROCEDURE — 85025 COMPLETE CBC W/AUTO DIFF WBC: CPT

## 2025-01-02 PROCEDURE — 97535 SELF CARE MNGMENT TRAINING: CPT

## 2025-01-02 PROCEDURE — 83735 ASSAY OF MAGNESIUM: CPT

## 2025-01-02 PROCEDURE — 2500000003 HC RX 250 WO HCPCS: Performed by: INTERNAL MEDICINE

## 2025-01-02 PROCEDURE — 1200000000 HC SEMI PRIVATE

## 2025-01-02 RX ADMIN — VALSARTAN 320 MG: 320 TABLET ORAL at 08:38

## 2025-01-02 RX ADMIN — ATORVASTATIN CALCIUM 20 MG: 20 TABLET, FILM COATED ORAL at 20:06

## 2025-01-02 RX ADMIN — AMLODIPINE BESYLATE 10 MG: 10 TABLET ORAL at 08:39

## 2025-01-02 RX ADMIN — CARVEDILOL 12.5 MG: 6.25 TABLET, FILM COATED ORAL at 08:39

## 2025-01-02 RX ADMIN — CARVEDILOL 12.5 MG: 6.25 TABLET, FILM COATED ORAL at 18:00

## 2025-01-02 RX ADMIN — SODIUM CHLORIDE, PRESERVATIVE FREE 10 ML: 5 INJECTION INTRAVENOUS at 20:06

## 2025-01-02 RX ADMIN — Medication 2000 UNITS: at 08:39

## 2025-01-02 RX ADMIN — ENOXAPARIN SODIUM 40 MG: 100 INJECTION SUBCUTANEOUS at 08:38

## 2025-01-02 RX ADMIN — LACOSAMIDE 150 MG: 100 TABLET, FILM COATED ORAL at 08:39

## 2025-01-02 RX ADMIN — HYDROCHLOROTHIAZIDE 25 MG: 25 TABLET ORAL at 08:39

## 2025-01-02 RX ADMIN — LEVETIRACETAM 1500 MG: 750 TABLET, FILM COATED, EXTENDED RELEASE ORAL at 08:40

## 2025-01-02 RX ADMIN — SODIUM CHLORIDE, PRESERVATIVE FREE 10 ML: 5 INJECTION INTRAVENOUS at 08:42

## 2025-01-02 RX ADMIN — LEVETIRACETAM 1500 MG: 750 TABLET, FILM COATED, EXTENDED RELEASE ORAL at 20:06

## 2025-01-02 RX ADMIN — LACOSAMIDE 150 MG: 100 TABLET, FILM COATED ORAL at 20:06

## 2025-01-02 RX ADMIN — POTASSIUM CHLORIDE 40 MEQ: 1500 TABLET, EXTENDED RELEASE ORAL at 20:06

## 2025-01-02 NOTE — CONSULTS
by mouth daily  [DISCONTINUED] levETIRAcetam (KEPPRA) 1000 MG tablet, 1000 mg 2 x day  [DISCONTINUED] DILANTIN 30 MG ER capsule, TAKE TWO CAPSULES BY MOUTH AT BEDTIME (Patient taking differently: TAKE two caps at bedtime in addition to 100mg capsule ([id number]))  [DISCONTINUED] phenytoin (DILANTIN) 100 MG ER capsule, Take 1 capsule by mouth 2 times daily (Patient taking differently: Take 1 capsule by mouth nightly takes one 100mg)    Allergies:    Phenobarbital    Social History:    reports that he has never smoked. He has never used smokeless tobacco. He reports that he does not drink alcohol and does not use drugs.    Family History:   Non-contributory to this Urological problem  family history includes Cancer in his maternal grandmother; Diabetes in his father.    Review of Systems:  Constitutional: No fever or chills   Respiratory: negative for cough and hemoptysis  Cardiovascular: negative for chest pain and dyspnea  Gastrointestinal: negative for abdominal pain, diarrhea, nausea and vomiting   Derm: negative for rash and skin lesion(s)  Neurological: positive for seizures   Musculoskeletal: Negative    Psychiatric: Negative   : As above in the HPI, otherwise negative  All other reviews are negative    Physical Exam:     Vitals:  BP (!) 122/58   Pulse 67   Temp 97.4 °F (36.3 °C) (Oral)   Resp 19   Ht 1.727 m (5' 8\")   Wt 95.3 kg (210 lb)   SpO2 97%   BMI 31.93 kg/m²     General:  Awake, alert, oriented X 3. No apparent distress.  HEENT:  Normocephalic, atraumatic.  Lungs:  Respirations symmetric and non-labored.  Abdomen:  soft, nontender, no masses  Extremities:  No clubbing, cyanosis, or edema  Skin:  Warm and dry, no open lesions or rashes  Neuro: There are no motor or sensory deficits in the 4 quadrant extremities   Rectal: deferred  Genitourinary:  no paula     Labs:     Recent Labs     12/31/24  1208 01/01/25  0557 01/02/25  0632   WBC 7.3 8.1 7.4   RBC 4.70 4.30 4.28   HGB 14.9 13.4 13.4   HCT

## 2025-01-02 NOTE — ACP (ADVANCE CARE PLANNING)
Advance Care Planning   The patient has the following advanced directives on file:  Advance Directives       Power of  Living Will ACP-Advance Directive ACP-Power of     Not on File Filed on 09/25/13 Filed Not on File            The patient has appointed the following active healthcare agents:  Primary:  Mason [place] -   [phone]      HANG Monzon  1/2/2025

## 2025-01-03 LAB
ANION GAP SERPL CALCULATED.3IONS-SCNC: 10 MMOL/L (ref 7–16)
BASOPHILS # BLD: 0.03 K/UL (ref 0–0.2)
BASOPHILS NFR BLD: 0 % (ref 0–2)
BUN SERPL-MCNC: 24 MG/DL (ref 6–23)
CALCIUM SERPL-MCNC: 8.8 MG/DL (ref 8.6–10.2)
CHLORIDE SERPL-SCNC: 103 MMOL/L (ref 98–107)
CO2 SERPL-SCNC: 22 MMOL/L (ref 22–29)
CREAT SERPL-MCNC: 0.9 MG/DL (ref 0.7–1.2)
EOSINOPHIL # BLD: 0.09 K/UL (ref 0.05–0.5)
EOSINOPHILS RELATIVE PERCENT: 1 % (ref 0–6)
ERYTHROCYTE [DISTWIDTH] IN BLOOD BY AUTOMATED COUNT: 12.9 % (ref 11.5–15)
GFR, ESTIMATED: >90 ML/MIN/1.73M2
GLUCOSE SERPL-MCNC: 101 MG/DL (ref 74–99)
HCT VFR BLD AUTO: 40.1 % (ref 37–54)
HGB BLD-MCNC: 13.6 G/DL (ref 12.5–16.5)
IMM GRANULOCYTES # BLD AUTO: <0.03 K/UL (ref 0–0.58)
IMM GRANULOCYTES NFR BLD: 0 % (ref 0–5)
LEVETIRACETAM SERPL-MCNC: 34 UG/ML
LYMPHOCYTES NFR BLD: 2.78 K/UL (ref 1.5–4)
LYMPHOCYTES RELATIVE PERCENT: 32 % (ref 20–42)
MCH RBC QN AUTO: 31.4 PG (ref 26–35)
MCHC RBC AUTO-ENTMCNC: 33.9 G/DL (ref 32–34.5)
MCV RBC AUTO: 92.6 FL (ref 80–99.9)
MONOCYTES NFR BLD: 1.27 K/UL (ref 0.1–0.95)
MONOCYTES NFR BLD: 14 % (ref 2–12)
NEUTROPHILS NFR BLD: 53 % (ref 43–80)
NEUTS SEG NFR BLD: 4.64 K/UL (ref 1.8–7.3)
PHENYTOIN FREE SERPL-MCNC: 1.2 UG/ML (ref 1–2)
PLATELET # BLD AUTO: 245 K/UL (ref 130–450)
PMV BLD AUTO: 10.6 FL (ref 7–12)
POTASSIUM SERPL-SCNC: 4.1 MMOL/L (ref 3.5–5)
RBC # BLD AUTO: 4.33 M/UL (ref 3.8–5.8)
SODIUM SERPL-SCNC: 135 MMOL/L (ref 132–146)
WBC OTHER # BLD: 8.8 K/UL (ref 4.5–11.5)

## 2025-01-03 PROCEDURE — 6370000000 HC RX 637 (ALT 250 FOR IP): Performed by: INTERNAL MEDICINE

## 2025-01-03 PROCEDURE — 80048 BASIC METABOLIC PNL TOTAL CA: CPT

## 2025-01-03 PROCEDURE — 85025 COMPLETE CBC W/AUTO DIFF WBC: CPT

## 2025-01-03 PROCEDURE — 36415 COLL VENOUS BLD VENIPUNCTURE: CPT

## 2025-01-03 PROCEDURE — 1200000000 HC SEMI PRIVATE

## 2025-01-03 PROCEDURE — 2500000003 HC RX 250 WO HCPCS: Performed by: INTERNAL MEDICINE

## 2025-01-03 PROCEDURE — 6360000002 HC RX W HCPCS: Performed by: INTERNAL MEDICINE

## 2025-01-03 RX ORDER — ESCITALOPRAM OXALATE 5 MG/1
5 TABLET ORAL DAILY
Status: DISCONTINUED | OUTPATIENT
Start: 2025-01-03 | End: 2025-01-08 | Stop reason: HOSPADM

## 2025-01-03 RX ADMIN — ENOXAPARIN SODIUM 40 MG: 100 INJECTION SUBCUTANEOUS at 08:54

## 2025-01-03 RX ADMIN — ESCITALOPRAM 5 MG: 5 TABLET, FILM COATED ORAL at 08:53

## 2025-01-03 RX ADMIN — LEVETIRACETAM 1500 MG: 750 TABLET, FILM COATED, EXTENDED RELEASE ORAL at 20:31

## 2025-01-03 RX ADMIN — LACOSAMIDE 150 MG: 100 TABLET, FILM COATED ORAL at 20:30

## 2025-01-03 RX ADMIN — Medication 2000 UNITS: at 08:53

## 2025-01-03 RX ADMIN — AMLODIPINE BESYLATE 10 MG: 10 TABLET ORAL at 08:53

## 2025-01-03 RX ADMIN — HYDROCHLOROTHIAZIDE 25 MG: 25 TABLET ORAL at 08:53

## 2025-01-03 RX ADMIN — SODIUM CHLORIDE, PRESERVATIVE FREE 10 ML: 5 INJECTION INTRAVENOUS at 19:38

## 2025-01-03 RX ADMIN — LACOSAMIDE 150 MG: 100 TABLET, FILM COATED ORAL at 08:53

## 2025-01-03 RX ADMIN — SODIUM CHLORIDE, PRESERVATIVE FREE 10 ML: 5 INJECTION INTRAVENOUS at 08:56

## 2025-01-03 RX ADMIN — ATORVASTATIN CALCIUM 20 MG: 20 TABLET, FILM COATED ORAL at 20:30

## 2025-01-03 RX ADMIN — LEVETIRACETAM 1500 MG: 750 TABLET, FILM COATED, EXTENDED RELEASE ORAL at 08:57

## 2025-01-03 RX ADMIN — CARVEDILOL 12.5 MG: 6.25 TABLET, FILM COATED ORAL at 16:43

## 2025-01-03 RX ADMIN — CARVEDILOL 12.5 MG: 6.25 TABLET, FILM COATED ORAL at 08:53

## 2025-01-03 RX ADMIN — VALSARTAN 320 MG: 320 TABLET ORAL at 08:58

## 2025-01-03 NOTE — DISCHARGE INSTRUCTIONS
Call upon discharge to schedule a follow-up as needed with Lynn Sam/Donato/Mark ([facility]) at [phone]

## 2025-01-04 PROCEDURE — 6360000002 HC RX W HCPCS: Performed by: INTERNAL MEDICINE

## 2025-01-04 PROCEDURE — 6370000000 HC RX 637 (ALT 250 FOR IP): Performed by: INTERNAL MEDICINE

## 2025-01-04 PROCEDURE — 1200000000 HC SEMI PRIVATE

## 2025-01-04 PROCEDURE — 2500000003 HC RX 250 WO HCPCS: Performed by: INTERNAL MEDICINE

## 2025-01-04 RX ADMIN — ATORVASTATIN CALCIUM 20 MG: 20 TABLET, FILM COATED ORAL at 21:00

## 2025-01-04 RX ADMIN — CARVEDILOL 12.5 MG: 6.25 TABLET, FILM COATED ORAL at 17:11

## 2025-01-04 RX ADMIN — SODIUM CHLORIDE, PRESERVATIVE FREE 10 ML: 5 INJECTION INTRAVENOUS at 21:00

## 2025-01-04 RX ADMIN — LACOSAMIDE 150 MG: 100 TABLET, FILM COATED ORAL at 08:02

## 2025-01-04 RX ADMIN — ENOXAPARIN SODIUM 40 MG: 100 INJECTION SUBCUTANEOUS at 08:03

## 2025-01-04 RX ADMIN — CARVEDILOL 12.5 MG: 6.25 TABLET, FILM COATED ORAL at 08:02

## 2025-01-04 RX ADMIN — SODIUM CHLORIDE, PRESERVATIVE FREE 10 ML: 5 INJECTION INTRAVENOUS at 08:04

## 2025-01-04 RX ADMIN — Medication 2000 UNITS: at 08:01

## 2025-01-04 RX ADMIN — AMLODIPINE BESYLATE 10 MG: 10 TABLET ORAL at 08:02

## 2025-01-04 RX ADMIN — LACOSAMIDE 150 MG: 100 TABLET, FILM COATED ORAL at 21:00

## 2025-01-04 RX ADMIN — ESCITALOPRAM 5 MG: 5 TABLET, FILM COATED ORAL at 08:02

## 2025-01-04 RX ADMIN — VALSARTAN 320 MG: 320 TABLET ORAL at 08:03

## 2025-01-04 RX ADMIN — LEVETIRACETAM 1500 MG: 750 TABLET, FILM COATED, EXTENDED RELEASE ORAL at 08:04

## 2025-01-04 RX ADMIN — LEVETIRACETAM 1500 MG: 750 TABLET, FILM COATED, EXTENDED RELEASE ORAL at 20:59

## 2025-01-04 RX ADMIN — HYDROCHLOROTHIAZIDE 25 MG: 25 TABLET ORAL at 08:02

## 2025-01-04 NOTE — PLAN OF CARE
Problem: Skin/Tissue Integrity  Goal: Absence of new skin breakdown  Description: 1.  Monitor for areas of redness and/or skin breakdown  2.  Assess vascular access sites hourly  3.  Every 4-6 hours minimum:  Change oxygen saturation probe site  4.  Every 4-6 hours:  If on nasal continuous positive airway pressure, respiratory therapy assess nares and determine need for appliance change or resting period.  1/3/2025 1947 by Madeline Ren, RN  Outcome: Progressing  1/3/2025 1148 by Azul Campbell, TITA  Outcome: Progressing     Problem: Safety - Adult  Goal: Free from fall injury  1/3/2025 1947 by Madeline Ren, RN  Outcome: Progressing  1/3/2025 1148 by Azul Campbell, TITA  Outcome: Progressing

## 2025-01-05 PROCEDURE — 6370000000 HC RX 637 (ALT 250 FOR IP): Performed by: INTERNAL MEDICINE

## 2025-01-05 PROCEDURE — 1200000000 HC SEMI PRIVATE

## 2025-01-05 PROCEDURE — 6360000002 HC RX W HCPCS: Performed by: INTERNAL MEDICINE

## 2025-01-05 PROCEDURE — 2500000003 HC RX 250 WO HCPCS: Performed by: INTERNAL MEDICINE

## 2025-01-05 RX ADMIN — SODIUM CHLORIDE, PRESERVATIVE FREE 10 ML: 5 INJECTION INTRAVENOUS at 19:48

## 2025-01-05 RX ADMIN — LACOSAMIDE 150 MG: 100 TABLET, FILM COATED ORAL at 21:16

## 2025-01-05 RX ADMIN — VALSARTAN 320 MG: 320 TABLET ORAL at 09:37

## 2025-01-05 RX ADMIN — CARVEDILOL 12.5 MG: 6.25 TABLET, FILM COATED ORAL at 17:42

## 2025-01-05 RX ADMIN — SODIUM CHLORIDE, PRESERVATIVE FREE 10 ML: 5 INJECTION INTRAVENOUS at 09:40

## 2025-01-05 RX ADMIN — CARVEDILOL 12.5 MG: 6.25 TABLET, FILM COATED ORAL at 09:30

## 2025-01-05 RX ADMIN — ESCITALOPRAM 5 MG: 5 TABLET, FILM COATED ORAL at 09:32

## 2025-01-05 RX ADMIN — AMLODIPINE BESYLATE 10 MG: 10 TABLET ORAL at 09:32

## 2025-01-05 RX ADMIN — Medication 2000 UNITS: at 09:31

## 2025-01-05 RX ADMIN — HYDROCHLOROTHIAZIDE 25 MG: 25 TABLET ORAL at 09:44

## 2025-01-05 RX ADMIN — ENOXAPARIN SODIUM 40 MG: 100 INJECTION SUBCUTANEOUS at 09:30

## 2025-01-05 RX ADMIN — LACOSAMIDE 150 MG: 100 TABLET, FILM COATED ORAL at 09:32

## 2025-01-05 RX ADMIN — LEVETIRACETAM 1500 MG: 750 TABLET, FILM COATED, EXTENDED RELEASE ORAL at 09:38

## 2025-01-05 RX ADMIN — ATORVASTATIN CALCIUM 20 MG: 20 TABLET, FILM COATED ORAL at 21:17

## 2025-01-05 RX ADMIN — LEVETIRACETAM 1500 MG: 750 TABLET, FILM COATED, EXTENDED RELEASE ORAL at 21:17

## 2025-01-06 PROCEDURE — 97535 SELF CARE MNGMENT TRAINING: CPT

## 2025-01-06 PROCEDURE — 1200000000 HC SEMI PRIVATE

## 2025-01-06 PROCEDURE — 6370000000 HC RX 637 (ALT 250 FOR IP): Performed by: INTERNAL MEDICINE

## 2025-01-06 PROCEDURE — 2500000003 HC RX 250 WO HCPCS: Performed by: INTERNAL MEDICINE

## 2025-01-06 PROCEDURE — 6360000002 HC RX W HCPCS: Performed by: INTERNAL MEDICINE

## 2025-01-06 RX ADMIN — AMLODIPINE BESYLATE 10 MG: 10 TABLET ORAL at 08:22

## 2025-01-06 RX ADMIN — LEVETIRACETAM 1500 MG: 750 TABLET, FILM COATED, EXTENDED RELEASE ORAL at 21:10

## 2025-01-06 RX ADMIN — SODIUM CHLORIDE, PRESERVATIVE FREE 10 ML: 5 INJECTION INTRAVENOUS at 21:16

## 2025-01-06 RX ADMIN — SODIUM CHLORIDE, PRESERVATIVE FREE 10 ML: 5 INJECTION INTRAVENOUS at 08:22

## 2025-01-06 RX ADMIN — VALSARTAN 320 MG: 320 TABLET ORAL at 08:22

## 2025-01-06 RX ADMIN — LEVETIRACETAM 1500 MG: 750 TABLET, FILM COATED, EXTENDED RELEASE ORAL at 08:23

## 2025-01-06 RX ADMIN — ESCITALOPRAM 5 MG: 5 TABLET, FILM COATED ORAL at 08:22

## 2025-01-06 RX ADMIN — ENOXAPARIN SODIUM 40 MG: 100 INJECTION SUBCUTANEOUS at 08:22

## 2025-01-06 RX ADMIN — LACOSAMIDE 150 MG: 100 TABLET, FILM COATED ORAL at 08:22

## 2025-01-06 RX ADMIN — LACOSAMIDE 150 MG: 100 TABLET, FILM COATED ORAL at 21:08

## 2025-01-06 RX ADMIN — Medication 2000 UNITS: at 08:22

## 2025-01-06 RX ADMIN — HYDROCHLOROTHIAZIDE 25 MG: 25 TABLET ORAL at 08:22

## 2025-01-06 RX ADMIN — ATORVASTATIN CALCIUM 20 MG: 20 TABLET, FILM COATED ORAL at 21:08

## 2025-01-06 RX ADMIN — CARVEDILOL 12.5 MG: 6.25 TABLET, FILM COATED ORAL at 16:59

## 2025-01-06 NOTE — PLAN OF CARE
Problem: Skin/Tissue Integrity  Goal: Absence of new skin breakdown  Description: 1.  Monitor for areas of redness and/or skin breakdown  2.  Assess vascular access sites hourly  3.  Every 4-6 hours minimum:  Change oxygen saturation probe site  4.  Every 4-6 hours:  If on nasal continuous positive airway pressure, respiratory therapy assess nares and determine need for appliance change or resting period.  1/6/2025 1023 by Kassidy Ibrahim, RN  Outcome: Progressing  1/6/2025 0047 by Jaki Calderon RN  Outcome: Progressing     Problem: Safety - Adult  Goal: Free from fall injury  1/6/2025 1023 by Kassidy Ibrahim, RN  Outcome: Progressing  1/6/2025 0047 by Jaki Calderon, RN  Outcome: Progressing

## 2025-01-06 NOTE — DISCHARGE INSTR - COC
support    Rehab Therapies: Physical Therapy and Occupational Therapy  Weight Bearing Status/Restrictions: No weight bearing restrictions  Other Medical Equipment (for information only, NOT a DME order):  {EQUIPMENT:035994355}  Other Treatments: ***    Patient's personal belongings (please select all that are sent with patient):  Glasses    RN SIGNATURE:  Electronically signed by Ronni Rendon RN on 1/8/25 at 12:37 PM EST    CASE MANAGEMENT/SOCIAL WORK SECTION    Inpatient Status Date: 12/31/24    Readmission Risk Assessment Score:  [facility] RISK OF UNPLANNED READMISSION 2.0             4.5 Total Score        Discharging to Facility/ Agency   Name: [facility]  Address:  [address] [place] [address].  Phone:  Fax:    Dialysis Facility (if applicable)   Name:  Address:  Dialysis Schedule:  Phone:  Fax:    / signature: Electronically signed by HANG Monzon on 1/6/2025 at 4:14 PM      PHYSICIAN SECTION    Prognosis: {Prognosis:2849012886}    Condition at Discharge: { Patient Condition:719167786}    Rehab Potential (if transferring to Rehab): {Prognosis:2705388202}    Recommended Labs or Other Treatments After Discharge: ***    Physician Certification: I certify the above information and transfer of Truman Williamson  is necessary for the continuing treatment of the diagnosis listed and that he requires Assisted Living for greater 30 days.     Update Admission H&P: {CHP DME Changes in HandP:138807798}    PHYSICIAN SIGNATURE:  {Esignature:[id number]}

## 2025-01-07 PROCEDURE — 6370000000 HC RX 637 (ALT 250 FOR IP): Performed by: INTERNAL MEDICINE

## 2025-01-07 PROCEDURE — 2500000003 HC RX 250 WO HCPCS: Performed by: INTERNAL MEDICINE

## 2025-01-07 PROCEDURE — 1200000000 HC SEMI PRIVATE

## 2025-01-07 PROCEDURE — 97530 THERAPEUTIC ACTIVITIES: CPT

## 2025-01-07 PROCEDURE — 6360000002 HC RX W HCPCS: Performed by: INTERNAL MEDICINE

## 2025-01-07 RX ORDER — ESCITALOPRAM OXALATE 5 MG/1
5 TABLET ORAL DAILY
Qty: 30 TABLET | Refills: 3 | Status: SHIPPED | OUTPATIENT
Start: 2025-01-07

## 2025-01-07 RX ORDER — LACOSAMIDE 150 MG/1
150 TABLET ORAL 2 TIMES DAILY
Qty: 60 TABLET | Refills: 1 | Status: SHIPPED | OUTPATIENT
Start: 2025-01-07 | End: 2025-03-08

## 2025-01-07 RX ORDER — CHOLECALCIFEROL (VITAMIN D3) 50 MCG
2000 TABLET ORAL DAILY
Qty: 60 TABLET | Refills: 0 | Status: SHIPPED | OUTPATIENT
Start: 2025-01-07

## 2025-01-07 RX ADMIN — SODIUM CHLORIDE, PRESERVATIVE FREE 10 ML: 5 INJECTION INTRAVENOUS at 08:17

## 2025-01-07 RX ADMIN — CARVEDILOL 12.5 MG: 6.25 TABLET, FILM COATED ORAL at 16:55

## 2025-01-07 RX ADMIN — ATORVASTATIN CALCIUM 20 MG: 20 TABLET, FILM COATED ORAL at 20:28

## 2025-01-07 RX ADMIN — SODIUM CHLORIDE, PRESERVATIVE FREE 10 ML: 5 INJECTION INTRAVENOUS at 20:29

## 2025-01-07 RX ADMIN — AMLODIPINE BESYLATE 10 MG: 10 TABLET ORAL at 08:16

## 2025-01-07 RX ADMIN — LEVETIRACETAM 1500 MG: 750 TABLET, FILM COATED, EXTENDED RELEASE ORAL at 08:18

## 2025-01-07 RX ADMIN — ENOXAPARIN SODIUM 40 MG: 100 INJECTION SUBCUTANEOUS at 08:17

## 2025-01-07 RX ADMIN — LACOSAMIDE 150 MG: 100 TABLET, FILM COATED ORAL at 08:16

## 2025-01-07 RX ADMIN — LEVETIRACETAM 1500 MG: 750 TABLET, FILM COATED, EXTENDED RELEASE ORAL at 20:29

## 2025-01-07 RX ADMIN — LACOSAMIDE 150 MG: 100 TABLET, FILM COATED ORAL at 20:28

## 2025-01-07 RX ADMIN — HYDROCHLOROTHIAZIDE 25 MG: 25 TABLET ORAL at 08:17

## 2025-01-07 RX ADMIN — ESCITALOPRAM 5 MG: 5 TABLET, FILM COATED ORAL at 08:16

## 2025-01-07 RX ADMIN — Medication 2000 UNITS: at 08:16

## 2025-01-07 RX ADMIN — VALSARTAN 320 MG: 320 TABLET ORAL at 08:16

## 2025-01-07 NOTE — DISCHARGE SUMMARY
Internal Medicine Discharge Summary    NAME: Truman Williamson :  1960  MRN:  [id number] PCP:Jacob Steele MD    ADMITTED: 2024   DISCHARGED:   ADMITTING PHYSICIAN: Jacob Steele MD    PCP: Jacob Steele MD    CONSULTANT(S):   IP CONSULT TO INTERNAL MEDICINE  IP CONSULT TO SOCIAL WORK  IP CONSULT TO UROLOGY     ADMITTING DIAGNOSIS:   Failure to thrive in adult [R62.7]  Seizure (HCC) [R56.9]     Please see H&P for further details    DISCHARGE DIAGNOSES:   Active Hospital Problems    Diagnosis     Functional urinary incontinence [R39.81]     Failure to thrive in adult [R62.7]     Seizure (HCC) [R56.9]        BRIEF HISTORY OF PRESENT ILLNESS: Truman Williamson is a 64 y.o. male patient of Jacob Steele MD who  has a past medical history of Epilepsy (HCC), Essential hypertension, GERD (gastroesophageal reflux disease), Hyperlipidemia LDL goal <100, and Seizures (HCC). who originally had concerns including Medication Reaction (Sent by Dr. Steele. Patient medication got changed from Dilantin to Vimpat now patient has increased fatigue and weakness after taking more medications. ) and Seizures (Family and caregiver report that patient is having seizures but patient states he is not having seizures. ). at presentation on 2024, and was found to have Failure to thrive in adult [R62.7]  Seizure (HCC) [R56.9] after workup.    Please see H&P for further details.    HOSPITAL COURSE:   The patient presented to the hospital with the chief complaint of Medication Reaction (Sent by Dr. Steele. Patient medication got changed from Dilantin to Vimpat now patient has increased fatigue and weakness after taking more medications. ) and Seizures (Family and caregiver report that patient is having seizures but patient states he is not having seizures. )  . The patient was admitted to the hospital. Dw brother- wo is iniating guardianship process. He will go to Assisted living to spend down then apply for medicaid.

## 2025-01-08 VITALS
HEIGHT: 68 IN | HEART RATE: 57 BPM | BODY MASS INDEX: 31.83 KG/M2 | OXYGEN SATURATION: 95 % | SYSTOLIC BLOOD PRESSURE: 109 MMHG | RESPIRATION RATE: 16 BRPM | TEMPERATURE: 97 F | DIASTOLIC BLOOD PRESSURE: 58 MMHG | WEIGHT: 210 LBS

## 2025-01-08 PROCEDURE — 2500000003 HC RX 250 WO HCPCS: Performed by: INTERNAL MEDICINE

## 2025-01-08 PROCEDURE — 6370000000 HC RX 637 (ALT 250 FOR IP): Performed by: INTERNAL MEDICINE

## 2025-01-08 PROCEDURE — 6360000002 HC RX W HCPCS: Performed by: INTERNAL MEDICINE

## 2025-01-08 RX ORDER — VALSARTAN 320 MG/1
320 TABLET ORAL DAILY
Qty: 90 TABLET | Refills: 1 | Status: SHIPPED | OUTPATIENT
Start: 2025-01-08

## 2025-01-08 RX ORDER — AMLODIPINE BESYLATE 10 MG/1
10 TABLET ORAL DAILY
Qty: 30 TABLET | Refills: 0 | Status: SHIPPED | OUTPATIENT
Start: 2025-01-08

## 2025-01-08 RX ADMIN — LEVETIRACETAM 1500 MG: 750 TABLET, FILM COATED, EXTENDED RELEASE ORAL at 08:48

## 2025-01-08 RX ADMIN — SODIUM CHLORIDE, PRESERVATIVE FREE 10 ML: 5 INJECTION INTRAVENOUS at 08:48

## 2025-01-08 RX ADMIN — Medication 2000 UNITS: at 08:48

## 2025-01-08 RX ADMIN — CARVEDILOL 12.5 MG: 6.25 TABLET, FILM COATED ORAL at 08:48

## 2025-01-08 RX ADMIN — LACOSAMIDE 150 MG: 100 TABLET, FILM COATED ORAL at 08:49

## 2025-01-08 RX ADMIN — ESCITALOPRAM 5 MG: 5 TABLET, FILM COATED ORAL at 08:49

## 2025-01-08 RX ADMIN — ENOXAPARIN SODIUM 40 MG: 100 INJECTION SUBCUTANEOUS at 08:48

## 2025-01-08 NOTE — PROGRESS NOTES
1/3/2025 4:39 PM  Truman Williamson  [id number]    Subjective:    He is laying in bed   He is voiding without difficulty     Review of Systems  Constitutional: No fever or chills   Respiratory: negative for cough and hemoptysis  Cardiovascular: negative for chest pain and dyspnea  Gastrointestinal: negative for abdominal pain, diarrhea, nausea and vomiting   : See above  Derm: negative for rash and skin lesion(s)  Neurological: negative for seizures and tremors  Musculoskeletal: Negative    Psychiatric: Negative   All other reviews are negative      Scheduled Meds:   escitalopram  5 mg Oral Daily    vitamin D  2,000 Units Oral Daily    sodium chloride flush  5-40 mL IntraVENous 2 times per day    enoxaparin  40 mg SubCUTAneous Daily    atorvastatin  20 mg Oral Nightly    carvedilol  12.5 mg Oral BID WC    lacosamide  150 mg Oral BID    amLODIPine  10 mg Oral Daily    And    valsartan  320 mg Oral Daily    And    hydroCHLOROthiazide  25 mg Oral Daily    levETIRAcetam  1,500 mg Oral BID    phenytoin  30 mg Oral Nightly       Objective:  Vitals:    01/03/25 0755   BP: 126/68   Pulse: 57   Resp: 18   Temp: 97.2 °F (36.2 °C)   SpO2: 98%         Allergies: Phenobarbital    General Appearance: alert and oriented to person, place and time and in no acute distress  Skin: no rash or erythema  Head: normocephalic and atraumatic  Pulmonary/Chest: normal air movement, no respiratory distress  Abdomen: soft, non-tender, non-distended  Genitourinary: no paula   Extremities: no cyanosis, clubbing or edema         Labs:     Recent Labs     01/03/25  0523      K 4.1      CO2 22   BUN 24*   CREATININE 0.9   GLUCOSE 101*   CALCIUM 8.8       Lab Results   Component Value Date/Time    HGB 13.6 01/03/2025 05:23 AM    HCT 40.1 01/03/2025 05:23 AM       No results found for: \"PSA\"      Assessment/Plan:  Urinary incontinence     Creatinine stable   UA reviewed   PVR was 0cc   He does not need a paula   No further  
4 Eyes Skin Assessment     NAME:  Truman Williamson  YOB: 1960  MEDICAL RECORD NUMBER:  [id number]    The patient is being assessed for  Admission    I agree that at least one RN has performed a thorough Head to Toe Skin Assessment on the patient. ALL assessment sites listed below have been assessed.      Areas assessed by both nurses:    Head, Face, Ears, Shoulders, Back, Chest, Arms, Elbows, Hands, Sacrum. Buttock, Coccyx, Ischium, and Legs. Feet and Heels        Does the Patient have a Wound? No noted wound(s)       Rickey Prevention initiated by RN: Yes  Wound Care Orders initiated by RN: No    Pressure Injury (Stage 3,4, Unstageable, DTI, NWPT, and Complex wounds) if present, place Wound referral order by RN under : No    New Ostomies, if present place, Ostomy referral order under : No     Nurse 1 eSignature: Electronically signed by Senait Iverson RN on 12/31/24 at 6:57 PM EST    **SHARE this note so that the co-signing nurse can place an eSignature**    Nurse 2 eSignature: {Esignature:[id number]}  
Internal Medicine Progress Note    Patient's name: Truman Williamson  : 1960  Chief complaints (on day of admission): Medication Reaction (Sent by Dr. Steele. Patient medication got changed from Dilantin to Vimpat now patient has increased fatigue and weakness after taking more medications. ) and Seizures (Family and caregiver report that patient is having seizures but patient states he is not having seizures. )  Admission date: 2024  Date of service: 2025   Room: [address] MED SURG ONC  Primary care physician: Jacob Steele MD  Reason for visit: Follow-up for:  failure to thrive    Subjective  Truman was seen and examined.    Review of Systems NEG x 10  There are no new complaints of chest pain, shortness of breath, abdominal pain, nausea, vomiting, diarrhea, constipation.    Hospital Medications  Current Facility-Administered Medications   Medication Dose Route Frequency Provider Last Rate Last Admin    vitamin D (CHOLECALCIFEROL) tablet 2,000 Units  2,000 Units Oral Daily Jacob Steele MD   2,000 Units at 25 09    sodium chloride flush 0.9 % injection 5-40 mL  5-40 mL IntraVENous 2 times per day Jacob Steele MD   10 mL at 25    sodium chloride flush 0.9 % injection 5-40 mL  5-40 mL IntraVENous PRN Jacob Steele MD        0.9 % sodium chloride infusion   IntraVENous PRN Jacob Steele MD        potassium chloride (KLOR-CON M) extended release tablet 40 mEq  40 mEq Oral PRN Jacob Steele MD   40 mEq at 25    Or    potassium bicarb-citric acid (EFFER-K) effervescent tablet 40 mEq  40 mEq Oral PRN Jacob Steele MD        Or    potassium chloride 10 mEq/100 mL IVPB (Peripheral Line)  10 mEq IntraVENous PRN Jacob Steele MD        magnesium sulfate 2000 mg in 50 mL IVPB premix  2,000 mg IntraVENous PRN Jacob Steele MD        enoxaparin (LOVENOX) injection 40 mg  40 mg SubCUTAneous Daily Jacob Steele MD   40 mg at 25 0900    ondansetron 
Internal Medicine Progress Note    Patient's name: Truman Williamson  : 1960  Chief complaints (on day of admission): Medication Reaction (Sent by Dr. Steele. Patient medication got changed from Dilantin to Vimpat now patient has increased fatigue and weakness after taking more medications. ) and Seizures (Family and caregiver report that patient is having seizures but patient states he is not having seizures. )  Admission date: 2024  Date of service: 2025   Room: [address] MED SURG ONC  Primary care physician: Jacob Steele MD  Reason for visit: Follow-up for:  failure to thrive    Subjective  Truman was seen and examined.    Review of Systems NEG x 10-EXCEPT WANTS TO GO HOME. I dw bro x3  and SW. Brother is instituting guardianship process. Pt agrees to facility but is not happy with that.     He has limited capacity to understand his conditions, and poor insight. (Brother tells me he was in \"slow learner\" class in school)  There are no new complaints of chest pain, shortness of breath, abdominal pain, nausea, vomiting, diarrhea, constipation.    Hospital Medications  Current Facility-Administered Medications   Medication Dose Route Frequency Provider Last Rate Last Admin    escitalopram (LEXAPRO) tablet 5 mg  5 mg Oral Daily Jacob Steele MD   5 mg at 25    vitamin D (CHOLECALCIFEROL) tablet 2,000 Units  2,000 Units Oral Daily Jacob Steele MD   2,000 Units at 25    sodium chloride flush 0.9 % injection 5-40 mL  5-40 mL IntraVENous 2 times per day Jacob Steele MD   10 mL at 25    sodium chloride flush 0.9 % injection 5-40 mL  5-40 mL IntraVENous PRN Jacob Steele MD        0.9 % sodium chloride infusion   IntraVENous PRN Jacob Steele MD        potassium chloride (KLOR-CON M) extended release tablet 40 mEq  40 mEq Oral PRN Jacob Steele MD   40 mEq at 25    Or    potassium bicarb-citric acid (EFFER-K) effervescent tablet 40 mEq  40 mEq 
Internal Medicine Progress Note    Patient's name: Truman Williamson  : 1960  Chief complaints (on day of admission): Medication Reaction (Sent by Dr. Steele. Patient medication got changed from Dilantin to Vimpat now patient has increased fatigue and weakness after taking more medications. ) and Seizures (Family and caregiver report that patient is having seizures but patient states he is not having seizures. )  Admission date: 2024  Date of service: 2025   Room: [address] MED SURG ONC  Primary care physician: Jacob Steele MD  Reason for visit: Follow-up for:  failure to thrive    Subjective  Truman was seen and examined.    Review of Systems NEG x 10-EXCEPT WANTS TO GO HOME. I dw bro x3 yesterday and SW. Brother is instituting guardianship process. Pt agrees to facility but is not happy with that.     He has limited capacity to understand his conditions, and poor insight. (Brother tells me he was in \"slow learner\" class in school)  There are no new complaints of chest pain, shortness of breath, abdominal pain, nausea, vomiting, diarrhea, constipation.    Hospital Medications  Current Facility-Administered Medications   Medication Dose Route Frequency Provider Last Rate Last Admin    escitalopram (LEXAPRO) tablet 5 mg  5 mg Oral Daily Jacob Steele MD   5 mg at 25 0802    vitamin D (CHOLECALCIFEROL) tablet 2,000 Units  2,000 Units Oral Daily Jacob Steele MD   2,000 Units at 25 0801    sodium chloride flush 0.9 % injection 5-40 mL  5-40 mL IntraVENous 2 times per day Jacob Steele MD   10 mL at 25 0804    sodium chloride flush 0.9 % injection 5-40 mL  5-40 mL IntraVENous PRN Jacob Steele MD        0.9 % sodium chloride infusion   IntraVENous PRN Jacob Steele MD        potassium chloride (KLOR-CON M) extended release tablet 40 mEq  40 mEq Oral PRN Jacob Steele MD   40 mEq at 25    Or    potassium bicarb-citric acid (EFFER-K) effervescent tablet 40 mEq  
Internal Medicine Progress Note    Patient's name: Truman Williamson  : 1960  Chief complaints (on day of admission): Medication Reaction (Sent by Dr. Steele. Patient medication got changed from Dilantin to Vimpat now patient has increased fatigue and weakness after taking more medications. ) and Seizures (Family and caregiver report that patient is having seizures but patient states he is not having seizures. )  Admission date: 2024  Date of service: 2025   Room: [address] MED SURG ONC  Primary care physician: Jacob Steele MD  Reason for visit: Follow-up for:  failure to thrive    Subjective  Truman was seen and examined.    Review of Systems NEG x 10-EXCEPT WANTS TO GO HOME. I dw bro x3 yesterday and SW. Brother is instituting guardianship process. Pt agrees to facility but is not happy with that.     He has limited capacity to understand his conditions, and poor insight. (Brother tells me he was in \"slow learner\" class in school)  There are no new complaints of chest pain, shortness of breath, abdominal pain, nausea, vomiting, diarrhea, constipation.    Hospital Medications  Current Facility-Administered Medications   Medication Dose Route Frequency Provider Last Rate Last Admin    escitalopram (LEXAPRO) tablet 5 mg  5 mg Oral Daily Jacob Steele MD   5 mg at 25 0932    vitamin D (CHOLECALCIFEROL) tablet 2,000 Units  2,000 Units Oral Daily Jacob Steele MD   2,000 Units at 25 0931    sodium chloride flush 0.9 % injection 5-40 mL  5-40 mL IntraVENous 2 times per day Jacob Steele MD   10 mL at 25    sodium chloride flush 0.9 % injection 5-40 mL  5-40 mL IntraVENous PRN Jacob Steele MD        0.9 % sodium chloride infusion   IntraVENous PRN Jacob Steele MD        potassium chloride (KLOR-CON M) extended release tablet 40 mEq  40 mEq Oral PRN Jacob Steele MD   40 mEq at 25    Or    potassium bicarb-citric acid (EFFER-K) effervescent tablet 40 mEq  
Internal Medicine Progress Note    Patient's name: Truman Williamson  : 1960  Chief complaints (on day of admission): Medication Reaction (Sent by Dr. Steele. Patient medication got changed from Dilantin to Vimpat now patient has increased fatigue and weakness after taking more medications. ) and Seizures (Family and caregiver report that patient is having seizures but patient states he is not having seizures. )  Admission date: 2024  Date of service: 2025   Room: [address] MED SURG ONC  Primary care physician: Jacob Steele MD  Reason for visit: Follow-up for:  failure to thrive    Subjective  Truman was seen and examined.    Review of Systems NEG x 10-EXCEPT WANTS TO GO HOME. I previously dw bro x3  and SW. Brother is instituting guardianship process. Pt agrees to facility but is not happy with that.     He has limited capacity to understand his conditions, and poor insight. (Brother tells me he was in \"slow learner\" class in school)  There are no new complaints of chest pain, shortness of breath, abdominal pain, nausea, vomiting, diarrhea, constipation.    Hospital Medications  Current Facility-Administered Medications   Medication Dose Route Frequency Provider Last Rate Last Admin    escitalopram (LEXAPRO) tablet 5 mg  5 mg Oral Daily Jacob Steele MD   5 mg at 25    vitamin D (CHOLECALCIFEROL) tablet 2,000 Units  2,000 Units Oral Daily Jacob Steele MD   2,000 Units at 25    sodium chloride flush 0.9 % injection 5-40 mL  5-40 mL IntraVENous 2 times per day Jacob Steele MD   10 mL at 25    sodium chloride flush 0.9 % injection 5-40 mL  5-40 mL IntraVENous PRN Jacob Steele MD        0.9 % sodium chloride infusion   IntraVENous PRN Jacob Steele MD        potassium chloride (KLOR-CON M) extended release tablet 40 mEq  40 mEq Oral PRN Jacob Steele MD   40 mEq at 25    Or    potassium bicarb-citric acid (EFFER-K) effervescent tablet 40 mEq 
Messaged Dr Steele via PS on medical advise for am antihypertensive meds per latest VS recorded.  
Occupational Therapy    OCCUPATIONAL THERAPY INITIAL EVALUATION    [facility]  [address] [place], [place]        Date:2025                                                  Patient Name: Truman Williamson    MRN: [id number]    : 1960    Room: [address] [place] [address]          Evaluating OT: Sabrina Gutierrez, TALIA, OTR/L; [id number]      Occupational therapy physician order:   Start   Ordering Provider    24  OT eval and treat  Start:  24,   End:  24,   ONE TIME,   Standing Count:  1 Occurrences,   R         Jacob Steele MD          Pt presents to ED with  increased fatigue and weakness      Diagnosis:   1. Seizure (HCC)    2. Adverse effect of drug, initial encounter       Patient Active Problem List   Diagnosis    Syncope    Seizure disorder (HCC)    Atypical chest pain    Anxiety    Essential hypertension    Hyperlipidemia LDL goal <100    Bilateral carpal tunnel syndrome    Ulnar neuropathy of both upper extremities    Gastroesophageal reflux disease with esophagitis    Closed displaced fracture of triquetrum of left wrist    Acute depression    Severe episode of recurrent major depressive disorder, without psychotic features (HCC)    Nonintractable headache    Secondary hypertension    Contusion of left chest wall    Closed fracture of fourth lumbar vertebra (HCC)    Traumatic hematoma of lower back    Adrenal nodule (HCC)    Failure to thrive in adult    Seizure (HCC)    Functional urinary incontinence          Pertinent Medical History:   Past Medical History:   Diagnosis Date    Epilepsy (HCC)     Essential hypertension 10/11/2015    GERD (gastroesophageal reflux disease)     Hyperlipidemia LDL goal <100 10/11/2015    Seizures (HCC)           Surgery/Procedures: none this admission        Recommended Adaptive Equipment: TBD        Precautions:  Fall Risk     Assessment of current deficits    [x] Functional mobility 
Patient bladder scanned per Dr order. 97 ml present in bladder.  
Patient voided 100 mL of yellow urine. Bladder scanned after showing 0 mL   
Physical Therapy  Facility/Department: [address] MED SURG ONC  Physical Therapy Initial Assessment    Name: Truman Williamson  : 1960  MRN: [id number]  Date of Service: 2025        Patient Diagnosis(es): The primary encounter diagnosis was Seizure (HCC). A diagnosis of Adverse effect of drug, initial encounter was also pertinent to this visit.  Past Medical History:  has a past medical history of Epilepsy (HCC), Essential hypertension, GERD (gastroesophageal reflux disease), Hyperlipidemia LDL goal <100, and Seizures (HCC).  Past Surgical History:  has a past surgical history that includes knee surgery; ECHO Compl W Dop Color Flow (2013); and fracture surgery.    Evaluating Therapist: Vicki Roberts PT    Room #:  8421/8421-A  Diagnosis:  Failure to thrive in adult [R62.7]  Seizure (HCC) [R56.9]  PMHx/PSHx:  Epilepsy, GERD  Precautions:  falls, alarm      Social:  Pt lives alone in a 1 floor plan.  Prior to admission independent without device. Has assist with transportation.     Initial Evaluation  Date: 25 Treatment      Short Term/ Long Term   Goals   Was pt agreeable to Eval/treatment? yes     Does pt have pain? No c/o pain     Bed Mobility  Rolling: independent  Supine to sit: independent  Sit to supine: independent  Scooting: independent  indepdnent   Transfers Sit to stand: SBA  Stand to sit: SBA  Stand pivot: SBA  independent   Ambulation    150 feet with no device with CG/SBA  300 feet with no device independent   Stair Negotiation  Ascended and descended  NT   4 steps with 1 rail independent   LE strength     4-/5    4/5   balance      Fair+     AM-PAC Raw score               20/24         Pt is alert and Oriented   LE ROM: WFL  Sensation: intact  Edema: none  Endurance: fair+     ASSESSMENT:    Pt displays functional ability as noted in the objective portion of this evaluation.      Patient education  Pt educated on PT objectives    Patient response to education:   Pt verbalized 
Report given to nurse gonzáles at [facility]. Aware Vimpat is supplied by pt and script is attached along with the AVS/dc/ALEXI papers; given to pt. PIV removed w/o complication. As per CM/SW, brother of the pt provides own transport to facility. Pt aware of dc status and instructions.  
40 mEq Oral PRN Jacob Steele MD        Or    potassium chloride 10 mEq/100 mL IVPB (Peripheral Line)  10 mEq IntraVENous PRN Jacob Steele MD        magnesium sulfate 2000 mg in 50 mL IVPB premix  2,000 mg IntraVENous PRN Jacob Steele MD        enoxaparin (LOVENOX) injection 40 mg  40 mg SubCUTAneous Daily Jacob Steele MD   40 mg at 01/04/25 0803    ondansetron (ZOFRAN-ODT) disintegrating tablet 4 mg  4 mg Oral Q8H PRN Jacob Steele MD        Or    ondansetron (ZOFRAN) injection 4 mg  4 mg IntraVENous Q6H PRN Jacob Steele MD        polyethylene glycol (GLYCOLAX) packet 17 g  17 g Oral Daily PRN Jacob Steele MD        acetaminophen (TYLENOL) tablet 650 mg  650 mg Oral Q6H PRN Jacob Steele MD        Or    acetaminophen (TYLENOL) suppository 650 mg  650 mg Rectal Q6H PRN Jacob Steele MD        atorvastatin (LIPITOR) tablet 20 mg  20 mg Oral Nightly Jacob Steele MD   20 mg at 01/04/25 2100    carvedilol (COREG) tablet 12.5 mg  12.5 mg Oral BID WC Jacob Steele MD   12.5 mg at 01/04/25 1711    lacosamide (VIMPAT) tablet 150 mg  150 mg Oral BID Jacob Steele MD   150 mg at 01/04/25 2100    amLODIPine (NORVASC) tablet 10 mg  10 mg Oral Daily Jacob Steele MD   10 mg at 01/04/25 0802    And    valsartan (DIOVAN) tablet 320 mg  320 mg Oral Daily Jacob Steele MD   320 mg at 01/04/25 0803    And    hydroCHLOROthiazide (HYDRODIURIL) tablet 25 mg  25 mg Oral Daily Jacob Steele MD   25 mg at 01/04/25 0802    levETIRAcetam (KEPPRA XR) extended release tablet (Patient Supplied)  1,500 mg Oral BID Jacob Steele MD   1,500 mg at 01/04/25 2059       PRN Medications  sodium chloride flush, sodium chloride, potassium chloride **OR** potassium alternative oral replacement **OR** potassium chloride, magnesium sulfate, ondansetron **OR** ondansetron, polyethylene glycol, acetaminophen **OR** acetaminophen    Objective  Most Recent Recorded Vitals  /62   Pulse 53   Temp 97.6 °F (36.4 °C) 
for safe discharge home and/or into the community   [] Positioning to prevent skin breakdown and contractures  [x] Safety and Education Training   [x] Patient/Caregiver Education   [] HEP  [] Other     PT long term treatment goals are located in above grid    Frequency of treatments: 2-5x/week x 3-5 days.    Time in  1023  Time out  1048        Evaluation Time includes thorough review of current medical information, gathering information on past medical history/social history and prior level of function, completion of standardized testing/informal observation of tasks, assessment of data and education on plan of care and goals.      CPT codes:  [] Low Complexity PT evaluation 92368  [] Moderate Complexity PT evaluation 97774  [] High Complexity PT evaluation 66400  [] PT Re-evaluation 24823  [] Gait training 61849 minutes  [] Manual therapy 82937 minutes  [x] Therapeutic activities 83048   25 minutes  [] Therapeutic exercises 22233 minutes  [] Neuromuscular reeducation 99363 minutes     Serjio Carrera, PT, DPT  [id number]  
improve skin integrity, interaction with environment and functional independence     Modified [place] Scale   Score     Description  0             No symptoms  1             No significant disability despite symptoms  2             Slight disability; able to look after own affairs  3             Moderate disability; able to ambulate without assist/ requires assist with ADLs  4             Moderate/Severe disability;requires assist to ambulate/assist with ADLs  5             Severe disability;bedridden/incontinent   6               Score:   4     Home Living: Pt lives alone in a 1 story house with 0 steps to enter  and no hand rails    Bedroom setup: main level  standard bed   Bathroom setup: main level  tub/shower combination  and standard commode      Equipment owned: none     Prior Level of Function:  Independent with ADLs , Independent with IADLs; using no AD for functional mobility.   Driving: not currently, sister assists  Occupation: retired from [employer]     Pain Level: No c/o pain.     Cognition: A&O: /              Follows 1-2 step directions: Fair              Memory: Fair +              Sequencing: Fair +              Problem solving: Fair +              Judgement/safety: Fair +              Attention:  Good                 Functional Assessment:  AM-PAC Daily Activity Raw Score:     Initial Eval Status  Date: 2025  Treatment Status  Date:  25 STGs = LTGs  Time frame: 10-14 days   Feeding Set up     Setup  Opening packages/containers.  Mod I       Grooming Stand by assist   seated  SBA/S  For balance/safety while standing at sink to wash/dry hands.  Mod I       UB Dressing Min A   Seated, assist to tie  SBA  For balance/safety while managing around trunk in back d/t unsteadiness in standing.  Mod I       LB Dressing Min A   Seated eob for socks via leg crossing strategy   Min A  To don/doff socks seated at EOB with assist required for leg cross over technique.  Mod I       Bathing Mod A 
last 3 completed shifts:  In: 120 [P.O.:120]  Out: -   No intake/output data recorded.    Physical Exam: SEEN IN ROOM   General: AAO to person/place/time, NAD, no labored breathing  Eyes: conjunctivae/corneas clear, sclera non icteric  Skin: color/texture/turgor normal, no rashes or lesions  Lungs: CTAB, no retractions/use of accessory muscles, no vocal fremitus, no rhonchi, no crackle, no rales  Heart: regular rate, regular rhythm, no murmur  Abdomen: soft, NT, bowel sounds normal  Extremities: atraumatic, no edema  Neurologic: cranial nerves 2-12 grossly intact, no slurred speech    Most Recent Labs  Lab Results   Component Value Date    WBC 8.8 01/03/2025    HGB 13.6 01/03/2025    HCT 40.1 01/03/2025     01/03/2025     01/03/2025    K 4.1 01/03/2025     01/03/2025    CREATININE 0.9 01/03/2025    BUN 24 (H) 01/03/2025    CO2 22 01/03/2025    GLUCOSE 101 (H) 01/03/2025    ALT 36 12/31/2024    AST 22 12/31/2024    INR 1.2 02/25/2015    APTT 29.3 02/25/2015    LABA1C 5.7 (H) 12/31/2019       CT HEAD WO CONTRAST   Final Result   1. No acute intracranial abnormality.   2. Changes of encephalomalacia left temporoparietal lobe. Left   temporoparietal craniotomy.         XR CHEST PORTABLE   Final Result   No acute cardiopulmonary disease.             Echocardiogram      Assessment   Active Hospital Problems    Diagnosis     Functional urinary incontinence [R39.81]     Failure to thrive in adult [R62.7]     Seizure (HCC) [R56.9]          CONSULTS:  IP CONSULT TO INTERNAL MEDICINE  IP CONSULT TO SOCIAL WORK  IP CONSULT TO UROLOGY  Plan  Cont same meds, work on placement- will need long-term ECF, for pt safety  Dw brother Mason [phone]   PT AM-PAC--   OT AM- PAC--     Follow labs   DVT prophylaxis  Please see orders for further management and care.  Discharge plan: TBD      Electronically signed by Jacob Steele MD on 1/3/2025 at 7:30 AM    I can be reached through [employer].   
technique, standing with assistance to maintain balance to wash of buttocks/hunog area for safety Mod I       Toileting Min A     SBA-transfer  Standard commode transfer    Pt declining need to complete toileting task this date    Mod I       Bed Mobility  Rolling L/R: Independent   Supine to sit: Independent   Sit to supine: Independent  Keren     Functional Transfers Sit to stand: Stand by assist    Stand to sit: Stand by assist    Stand pivot: Stand by assist   SBA  Sit to Stand  Stand to Sit   Sit to stand: Mod I    Stand to sit: Mod I    Stand pivot: Mod I    Functional Mobility Stand by assist  With no AD  in the room  and abernathy  in order to increase activity tolerance and strength for increased independence in ADLs and IADLs  SBA  Pt ambulated in room and within abernathy with no device Mod I     Balance Sitting:    Static: Independent    Dynamic: Supervision    Standing: Stand by assist   Sitting:  Independent    Standing:  SBA Sitting:       Dynamic: Mod I    Standing: Mod I     Activity Tolerance Fair +     Fair Good      Visual/  Perceptual Glasses: wears glasses             Safety Fair +  Fair+ Good during ADL completion    Vitals HR and SPO2 stable throughout session            Comments/Treatment/Education: Upon arrival, pt seated EOB, agreeable to therapy. Pt completed of transfers, functional mobility and light ADL tasks this session. Pt was educated on role of OT, goals to be reached, importance of OOB activity, safety and hand placement with transfers, safety/balance with functional mobility and compensatory strategies to assist with ADL tasks. At end of session, pt seated upright in chair, chair alarm on, all tubes and lines intact, call light within reach.       Pt has made fair progress towards set goals.   Continue with current plan of care    Treatment Time In: 10:32am          Treatment Time Out: 10:45am            Treatment Charges: Mins Units   Ther Ex  06050     Manual Therapy [id number]     Thera

## 2025-01-08 NOTE — CARE COORDINATION
Social Work/Case Management Transition of Care Planning (Rosina Farr -[phone]): Discharge order noted.  Discharge plan is to [facility].  Brother will transport. Plan was for discharge today.  However, received a message from Ana of [facility] stating there is no bed today.  Multiple attempts made to contact Ana for clarification have been unsuccessful.  Updated patient and his brother.  CM/SW will follow.    Rosina Farr, HANG  1/7/2025    
Social Work/Case Management Transition of Care Planning (Rosina Farr [facility] [phone]):  Per report and chart review, Urology was following.  No intervention and they have signed off.  Family and attending who is the  patient's PCP have concerns about patient's ability to care for himself.  Patient did agree to ECF but he does not have Medicaid coverage for this and he does not have any criteria for skilled care.  [facility] pending Medicaid screening.  Met with patient at bedside to discuss.  He said he really wants to go home.  Discussed concerns of his family and attending regarding his ability to care for himself.  He is agreeable to placement. Patient stated he would prefer to go to [facility].  Referral information provided to Tiny of [facility] to review. Await response.  CM/SW will follow.  HANG Monzon  1/6/2025    Update:  Per Tiny hoyt [facility], she does not have a long term care bed available.  She indicated their sister facilities - [facility] and [facility] might be able to accept.  Met with patient at bedside.  He stated he does not want to consider [place], [facility] or [facility].  He did agree to allow SW to call his brother, Mason, at [phone].  Spoke with Mason.  He stated he doesn't think patient takes his meds correctly and does not eat correctly when at home.  He has an aide that helps and he has paid housekeeping.  Brother orders meals for patient.  Brother has been looking at assisted living and is willing to  pay privately with his own funds until patient can be approved for Medicaid. He will provide SW will list of senior living he wants to consider.  CM/SW will follow.  HANG Monzon  1/6/2025    Update:  Spoke with patient's brother, Mason, again via phone.  He reports patient has approximately $50,000 in an annuity.  Patient would not be eligible for Medicaid.  Discussed private pay at SNF vs [facility] vs private duty care at home.  He will 
Social Work/Case Management Transition of Care Planning (Rosina Farr [facility] [phone]):  Discharge order noted.  Discharge plan is for patient to discharge to [facility].  Brother to transport.  Multiple attempts made to get clarification from Ana hoyt [facility] on 1/7 as to why the patient could not admit on that date.  Spoke with Abigail hoyt [facility].  Patient can admit today.  Spoke with patient's brother, Mason.  He will transport patient at 2:00pm.  Notified floor nurse.   HANG Monzon  1/8/2025    
Social Work/Case Management Transition of Care Planning (Rosina Farr \[facility]\"" [phone]):  Per report and chart review, urology was consulted for incontinence.  PVR checked.  No interventions by urology.  PT/OT scores noted to be 20/17.    Family would like patient to be placed due to concerns about inability to care for self.  Met with patient at bedside.  Againd discussed family concerns about his ability to care for himself and his safety.  He stated if they want him to go to a facility he will do it.  Discussed area he would want to be in and reviewed facilities in that area.  He choiced [facility].  Referral information provided to Kate for review.  Patient does not meet criteria for skilled admission and this would have to go through his medicaid for long term care.  Await response.  CM/SW will follow.  HANG Monzon  1/3/2025      Update:  Per Kate of [facility], patient only has [place] Medicaid which will not cover long term care.  [facility] cannot accept a pending Medicaid but their sister facility, [facility], may be able to accept.  The will need to do a Medicaid screening before they can determine acceptance.  CM/SW will follow.  HANG Monzon  1/3/2025    
[phone]      Notes:    Factors facilitating achievement of predicted outcomes: Family support    Barriers to discharge:     Additional Case Management Notes:     The Plan for Transition of Care is related to the following treatment goals of Failure to thrive in adult [R62.7]  Seizure (HCC) [R56.9]    IF APPLICABLE: The Patient and/or patient representative Truman and his family were provided with a choice of provider and agrees with the discharge plan. Freedom of choice list with basic dialogue that supports the patient's individualized plan of care/goals and shares the quality data associated with the providers was provided to:     Patient Representative Name:       The Patient and/or Patient Representative Agree with the Discharge Plan     HANG Monzon  Case Management Department  Ph: [phone]

## 2025-01-08 NOTE — PLAN OF CARE
Problem: Safety - Adult  Goal: Free from fall injury  1/8/2025 0929 by Ronni Rendon RN  Outcome: Progressing

## 2025-02-09 ENCOUNTER — APPOINTMENT (OUTPATIENT)
Dept: CT IMAGING | Age: 65
End: 2025-02-09
Payer: MEDICARE

## 2025-02-09 ENCOUNTER — HOSPITAL ENCOUNTER (EMERGENCY)
Age: 65
Discharge: HOME | End: 2025-02-09
Attending: EMERGENCY MEDICINE
Payer: MEDICARE

## 2025-02-09 VITALS
DIASTOLIC BLOOD PRESSURE: 58 MMHG | OXYGEN SATURATION: 96 % | BODY MASS INDEX: 31.83 KG/M2 | RESPIRATION RATE: 20 BRPM | HEIGHT: 68 IN | HEART RATE: 88 BPM | TEMPERATURE: 97.7 F | WEIGHT: 210 LBS | SYSTOLIC BLOOD PRESSURE: 110 MMHG

## 2025-02-09 DIAGNOSIS — R19.7 DIARRHEA, UNSPECIFIED TYPE: Primary | ICD-10-CM

## 2025-02-09 DIAGNOSIS — E86.0 DEHYDRATION: ICD-10-CM

## 2025-02-09 LAB
ALBUMIN SERPL-MCNC: 4.1 G/DL (ref 3.5–5.2)
ALP SERPL-CCNC: 70 U/L (ref 40–129)
ALT SERPL-CCNC: 20 U/L (ref 0–40)
AMMONIA PLAS-SCNC: 26 UMOL/L (ref 16–60)
ANION GAP SERPL CALCULATED.3IONS-SCNC: 10 MMOL/L (ref 7–16)
ANION GAP SERPL CALCULATED.3IONS-SCNC: 13 MMOL/L (ref 7–16)
AST SERPL-CCNC: 14 U/L (ref 0–39)
BASOPHILS # BLD: 0.01 K/UL (ref 0–0.2)
BASOPHILS NFR BLD: 0 % (ref 0–2)
BILIRUB SERPL-MCNC: 0.4 MG/DL (ref 0–1.2)
BILIRUB UR QL STRIP: NEGATIVE
BUN SERPL-MCNC: 35 MG/DL (ref 6–23)
BUN SERPL-MCNC: 40 MG/DL (ref 6–23)
CALCIUM SERPL-MCNC: 7.2 MG/DL (ref 8.6–10.2)
CALCIUM SERPL-MCNC: 8.5 MG/DL (ref 8.6–10.2)
CHLORIDE SERPL-SCNC: 102 MMOL/L (ref 98–107)
CHLORIDE SERPL-SCNC: 108 MMOL/L (ref 98–107)
CLARITY UR: CLEAR
CO2 SERPL-SCNC: 18 MMOL/L (ref 22–29)
CO2 SERPL-SCNC: 21 MMOL/L (ref 22–29)
COLOR UR: YELLOW
CREAT SERPL-MCNC: 1.2 MG/DL (ref 0.7–1.2)
CREAT SERPL-MCNC: 1.5 MG/DL (ref 0.7–1.2)
EOSINOPHIL # BLD: 0 K/UL (ref 0.05–0.5)
EOSINOPHILS RELATIVE PERCENT: 0 % (ref 0–6)
ERYTHROCYTE [DISTWIDTH] IN BLOOD BY AUTOMATED COUNT: 13.5 % (ref 11.5–15)
FLUAV RNA RESP QL NAA+PROBE: NOT DETECTED
FLUBV RNA RESP QL NAA+PROBE: NOT DETECTED
GFR, ESTIMATED: 51 ML/MIN/1.73M2
GFR, ESTIMATED: 67 ML/MIN/1.73M2
GLUCOSE SERPL-MCNC: 112 MG/DL (ref 74–99)
GLUCOSE SERPL-MCNC: 128 MG/DL (ref 74–99)
GLUCOSE UR STRIP-MCNC: NEGATIVE MG/DL
HCT VFR BLD AUTO: 41.3 % (ref 37–54)
HGB BLD-MCNC: 14.3 G/DL (ref 12.5–16.5)
HGB UR QL STRIP.AUTO: ABNORMAL
IMM GRANULOCYTES # BLD AUTO: <0.03 K/UL (ref 0–0.58)
IMM GRANULOCYTES NFR BLD: 0 % (ref 0–5)
KETONES UR STRIP-MCNC: NEGATIVE MG/DL
LACTATE BLDV-SCNC: 0.9 MMOL/L (ref 0.5–2.2)
LACTATE BLDV-SCNC: 2.7 MMOL/L (ref 0.5–2.2)
LEUKOCYTE ESTERASE UR QL STRIP: NEGATIVE
LIPASE SERPL-CCNC: 31 U/L (ref 13–60)
LYMPHOCYTES NFR BLD: 1.15 K/UL (ref 1.5–4)
LYMPHOCYTES RELATIVE PERCENT: 16 % (ref 20–42)
MCH RBC QN AUTO: 30.9 PG (ref 26–35)
MCHC RBC AUTO-ENTMCNC: 34.6 G/DL (ref 32–34.5)
MCV RBC AUTO: 89.2 FL (ref 80–99.9)
MONOCYTES NFR BLD: 0.82 K/UL (ref 0.1–0.95)
MONOCYTES NFR BLD: 11 % (ref 2–12)
NEUTROPHILS NFR BLD: 73 % (ref 43–80)
NEUTS SEG NFR BLD: 5.42 K/UL (ref 1.8–7.3)
NITRITE UR QL STRIP: NEGATIVE
PH UR STRIP: 6 [PH] (ref 5–8)
PLATELET # BLD AUTO: 228 K/UL (ref 130–450)
PMV BLD AUTO: 11 FL (ref 7–12)
POTASSIUM SERPL-SCNC: 3.8 MMOL/L (ref 3.5–5)
POTASSIUM SERPL-SCNC: 3.9 MMOL/L (ref 3.5–5)
PROT SERPL-MCNC: 8.1 G/DL (ref 6.4–8.3)
PROT UR STRIP-MCNC: NEGATIVE MG/DL
RBC # BLD AUTO: 4.63 M/UL (ref 3.8–5.8)
RBC #/AREA URNS HPF: ABNORMAL /HPF
SARS-COV-2 RNA RESP QL NAA+PROBE: NOT DETECTED
SODIUM SERPL-SCNC: 136 MMOL/L (ref 132–146)
SODIUM SERPL-SCNC: 136 MMOL/L (ref 132–146)
SOURCE: NORMAL
SP GR UR STRIP: <1.005 (ref 1–1.03)
SPECIMEN DESCRIPTION: NORMAL
UROBILINOGEN UR STRIP-ACNC: 0.2 EU/DL (ref 0–1)
VALPROATE SERPL-MCNC: <3 UG/ML (ref 50–100)
WBC #/AREA URNS HPF: ABNORMAL /HPF
WBC OTHER # BLD: 7.4 K/UL (ref 4.5–11.5)

## 2025-02-09 PROCEDURE — 74177 CT ABD & PELVIS W/CONTRAST: CPT

## 2025-02-09 PROCEDURE — 80053 COMPREHEN METABOLIC PANEL: CPT

## 2025-02-09 PROCEDURE — 96361 HYDRATE IV INFUSION ADD-ON: CPT

## 2025-02-09 PROCEDURE — 2580000003 HC RX 258: Performed by: EMERGENCY MEDICINE

## 2025-02-09 PROCEDURE — 96360 HYDRATION IV INFUSION INIT: CPT

## 2025-02-09 PROCEDURE — 99285 EMERGENCY DEPT VISIT HI MDM: CPT

## 2025-02-09 PROCEDURE — 85025 COMPLETE CBC W/AUTO DIFF WBC: CPT

## 2025-02-09 PROCEDURE — 83605 ASSAY OF LACTIC ACID: CPT

## 2025-02-09 PROCEDURE — 87636 SARSCOV2 & INF A&B AMP PRB: CPT

## 2025-02-09 PROCEDURE — 82140 ASSAY OF AMMONIA: CPT

## 2025-02-09 PROCEDURE — 80164 ASSAY DIPROPYLACETIC ACD TOT: CPT

## 2025-02-09 PROCEDURE — 6360000004 HC RX CONTRAST MEDICATION: Performed by: RADIOLOGY

## 2025-02-09 PROCEDURE — 81001 URINALYSIS AUTO W/SCOPE: CPT

## 2025-02-09 PROCEDURE — 83690 ASSAY OF LIPASE: CPT

## 2025-02-09 PROCEDURE — 80048 BASIC METABOLIC PNL TOTAL CA: CPT

## 2025-02-09 RX ORDER — 0.9 % SODIUM CHLORIDE 0.9 %
1000 INTRAVENOUS SOLUTION INTRAVENOUS ONCE
Status: COMPLETED | OUTPATIENT
Start: 2025-02-09 | End: 2025-02-09

## 2025-02-09 RX ORDER — IOPAMIDOL 755 MG/ML
75 INJECTION, SOLUTION INTRAVASCULAR
Status: COMPLETED | OUTPATIENT
Start: 2025-02-09 | End: 2025-02-09

## 2025-02-09 RX ADMIN — SODIUM CHLORIDE 1000 ML: 0.9 INJECTION, SOLUTION INTRAVENOUS at 12:21

## 2025-02-09 RX ADMIN — SODIUM CHLORIDE 1000 ML: 0.9 INJECTION, SOLUTION INTRAVENOUS at 16:06

## 2025-02-09 RX ADMIN — IOPAMIDOL 75 ML: 755 INJECTION, SOLUTION INTRAVENOUS at 13:28

## 2025-02-09 RX ADMIN — SODIUM CHLORIDE 1000 ML: 9 INJECTION, SOLUTION INTRAVENOUS at 14:03

## 2025-02-09 ASSESSMENT — PAIN DESCRIPTION - LOCATION: LOCATION: BUTTOCKS

## 2025-02-09 ASSESSMENT — PAIN DESCRIPTION - DESCRIPTORS: DESCRIPTORS: DISCOMFORT;SORE

## 2025-02-09 ASSESSMENT — PAIN SCALES - GENERAL: PAINLEVEL_OUTOF10: 5

## 2025-02-09 ASSESSMENT — PAIN - FUNCTIONAL ASSESSMENT: PAIN_FUNCTIONAL_ASSESSMENT: 0-10

## 2025-02-09 NOTE — ED PROVIDER NOTES
Calcium 7.2 (L) 8.6 - 10.2 mg/dL   Lactic Acid   Result Value Ref Range    Lactic Acid 0.9 0.5 - 2.2 mmol/L       RADIOLOGY:  Interpreted by Radiologist.  CT ABDOMEN PELVIS W IV CONTRAST Additional Contrast? None   Final Result   1. Haziness within the root of the mesentery along with a few scattered   mildly enlarged central and right mid abdomen mesenteric lymph nodes   nonspecific likely reactive or residual reactive lymph nodes which can be   seen in the setting of recent enteritis.   2. Colonic segments decompressed without fluid-filled distal colonic segments   to suggest evidence of a diarrheal state.  No small bowel obstruction   3. Small hiatal hernia.               RADIOLOGY:   Non-plain film images such as CT, Ultrasound and MRI are read by the radiologist. Plain radiographic images are visualized and preliminarily interpreted by the ED Provider       Interpretation per the Radiologist below, if available at the time of this note:    CT ABDOMEN PELVIS W IV CONTRAST Additional Contrast? None   Final Result   1. Haziness within the root of the mesentery along with a few scattered   mildly enlarged central and right mid abdomen mesenteric lymph nodes   nonspecific likely reactive or residual reactive lymph nodes which can be   seen in the setting of recent enteritis.   2. Colonic segments decompressed without fluid-filled distal colonic segments   to suggest evidence of a diarrheal state.  No small bowel obstruction   3. Small hiatal hernia.           No results found.    No results found.    PROCEDURES   Unless otherwise noted below, none       MDM:   Dr. Demetrio RANDHAWA am the primary physician of record.    Truman Williamson is a 64 y.o. male who presents to the ED for diarrhea  Vital signs upon arrival BP (!) 110/58   Pulse 88   Temp 97.7 °F (36.5 °C) (Oral)   Resp 20   Ht 1.727 m (5' 8\")   Wt 95.3 kg (210 lb)   SpO2 96%   BMI 31.93 kg/m²     History/physical examination/differential diagnosis/Labs and

## 2025-08-15 ENCOUNTER — HOSPITAL ENCOUNTER (EMERGENCY)
Age: 65
Discharge: HOME | End: 2025-08-15
Payer: MEDICARE

## 2025-08-15 VITALS
HEART RATE: 53 BPM | OXYGEN SATURATION: 96 % | RESPIRATION RATE: 16 BRPM | BODY MASS INDEX: 27.28 KG/M2 | DIASTOLIC BLOOD PRESSURE: 60 MMHG | SYSTOLIC BLOOD PRESSURE: 113 MMHG | TEMPERATURE: 97.5 F | WEIGHT: 180 LBS | HEIGHT: 68 IN

## 2025-08-15 DIAGNOSIS — H10.31 ACUTE CONJUNCTIVITIS OF RIGHT EYE, UNSPECIFIED ACUTE CONJUNCTIVITIS TYPE: Primary | ICD-10-CM

## 2025-08-15 DIAGNOSIS — H61.21 IMPACTED CERUMEN OF RIGHT EAR: ICD-10-CM

## 2025-08-15 PROCEDURE — 99283 EMERGENCY DEPT VISIT LOW MDM: CPT

## 2025-08-15 RX ORDER — POLYMYXIN B SULFATE AND TRIMETHOPRIM 1; 10000 MG/ML; [USP'U]/ML
1 SOLUTION OPHTHALMIC EVERY 4 HOURS
Qty: 3 ML | Refills: 0 | Status: SHIPPED | OUTPATIENT
Start: 2025-08-15 | End: 2025-08-22

## 2025-08-15 ASSESSMENT — PAIN DESCRIPTION - LOCATION: LOCATION: EYE

## 2025-08-15 ASSESSMENT — PAIN DESCRIPTION - ORIENTATION: ORIENTATION: RIGHT

## 2025-08-15 ASSESSMENT — PAIN DESCRIPTION - DESCRIPTORS: DESCRIPTORS: BURNING;SORE

## 2025-08-15 ASSESSMENT — PAIN DESCRIPTION - ONSET: ONSET: ON-GOING

## 2025-08-15 ASSESSMENT — PAIN SCALES - GENERAL: PAINLEVEL_OUTOF10: 5

## 2025-08-15 ASSESSMENT — PAIN DESCRIPTION - FREQUENCY: FREQUENCY: CONTINUOUS

## 2025-08-15 ASSESSMENT — PAIN - FUNCTIONAL ASSESSMENT
PAIN_FUNCTIONAL_ASSESSMENT: 0-10
PAIN_FUNCTIONAL_ASSESSMENT: ACTIVITIES ARE NOT PREVENTED

## 2025-08-15 ASSESSMENT — PAIN DESCRIPTION - PAIN TYPE: TYPE: ACUTE PAIN

## 2025-08-22 ENCOUNTER — HOSPITAL ENCOUNTER (EMERGENCY)
Age: 65
Discharge: HOME | End: 2025-08-22
Attending: EMERGENCY MEDICINE
Payer: MEDICARE

## 2025-08-22 VITALS
DIASTOLIC BLOOD PRESSURE: 61 MMHG | HEART RATE: 51 BPM | OXYGEN SATURATION: 97 % | TEMPERATURE: 97.5 F | RESPIRATION RATE: 18 BRPM | SYSTOLIC BLOOD PRESSURE: 115 MMHG

## 2025-08-22 DIAGNOSIS — H10.31 ACUTE CONJUNCTIVITIS OF RIGHT EYE, UNSPECIFIED ACUTE CONJUNCTIVITIS TYPE: Primary | ICD-10-CM

## 2025-08-22 PROCEDURE — 99283 EMERGENCY DEPT VISIT LOW MDM: CPT

## 2025-08-22 RX ORDER — TOBRAMYCIN 3 MG/ML
1 SOLUTION/ DROPS OPHTHALMIC EVERY 4 HOURS
Qty: 5 ML | Refills: 0 | Status: SHIPPED | OUTPATIENT
Start: 2025-08-22 | End: 2025-08-29

## 2025-08-22 ASSESSMENT — VISUAL ACUITY
OD: 20/200
OU: 20/25
OS: 20/30

## 2025-08-22 ASSESSMENT — PAIN - FUNCTIONAL ASSESSMENT: PAIN_FUNCTIONAL_ASSESSMENT: 0-10

## 2025-08-22 ASSESSMENT — PAIN SCALES - GENERAL: PAINLEVEL_OUTOF10: 0
